# Patient Record
Sex: FEMALE | Employment: OTHER | URBAN - METROPOLITAN AREA
[De-identification: names, ages, dates, MRNs, and addresses within clinical notes are randomized per-mention and may not be internally consistent; named-entity substitution may affect disease eponyms.]

---

## 2017-03-31 ENCOUNTER — OFFICE VISIT (OUTPATIENT)
Dept: CARDIOLOGY CLINIC | Age: 72
End: 2017-03-31

## 2017-03-31 VITALS
HEART RATE: 71 BPM | DIASTOLIC BLOOD PRESSURE: 78 MMHG | RESPIRATION RATE: 16 BRPM | SYSTOLIC BLOOD PRESSURE: 126 MMHG | WEIGHT: 99 LBS | OXYGEN SATURATION: 98 % | HEIGHT: 64 IN | BODY MASS INDEX: 16.9 KG/M2

## 2017-03-31 DIAGNOSIS — M54.9 BACK PAIN, UNSPECIFIED BACK LOCATION, UNSPECIFIED BACK PAIN LATERALITY, UNSPECIFIED CHRONICITY: ICD-10-CM

## 2017-03-31 DIAGNOSIS — I42.9 CARDIOMYOPATHY (HCC): Primary | ICD-10-CM

## 2017-03-31 DIAGNOSIS — I50.22 SYSTOLIC CHF, CHRONIC (HCC): ICD-10-CM

## 2017-03-31 DIAGNOSIS — M33.20 POLYMYOSITIS (HCC): ICD-10-CM

## 2017-03-31 DIAGNOSIS — R53.81 DEBILITY: ICD-10-CM

## 2017-03-31 DIAGNOSIS — G72.41 INCLUSION BODY MYOSITIS (IBM): ICD-10-CM

## 2017-03-31 RX ORDER — FOLIC ACID 1 MG/1
TABLET ORAL DAILY
COMMUNITY

## 2017-03-31 NOTE — MR AVS SNAPSHOT
Visit Information Date & Time Provider Department Dept. Phone Encounter #  
 3/31/2017  3:20 PM Frank Collins MD [facility] [place] [phone] [id number] Follow-up Instructions Return in about 6 months (around 9/30/2017). Your Appointments 10/2/2017  3:00 PM  
ECHO CARDIOGRAMS 2D with ECHO, STFRANCIS  
[facility] (ARI CRISTHIAN) Appt Note: echo at 3pm per Dr Trent Lee 6 month follow up at [facility] [address] [facility] [address]  
[phone]  
  
   
 [address] [facility] [place] [address] [facility] [place]  
  
    
 10/2/2017  4:00 PM  
ESTABLISHED PATIENT with Frank Collins MD  
[facility] (Addis Silverio) Appt Note: echo at 3pm per Dr Trent Lee 6 month follow up at [facility] [address] [facility]  
[address] [facility] [address] [address] [place], [place] [address]t Upcoming Health Maintenance Date Due Hepatitis C Screening 1945 DTaP/Tdap/Td series (1 - Tdap) 12/17/1967 BREAST CANCER SCRN MAMMOGRAM 12/17/1996 FOBT Q 1 YEAR AGE 50-75 12/17/1996 ZOSTER VACCINE AGE 60> 12/17/2006 GLAUCOMA SCREENING Q2Y 12/17/2011 Pneumococcal 65+ High/Highest Risk (1 of 2 - PCV13) 12/17/2011 MEDICARE YEARLY EXAM 12/17/2011 INFLUENZA AGE 9 TO ADULT 8/1/2016 Allergies as of 3/31/2017  Review Complete On: 3/31/2017 By: Renée Chavez NP Severity Noted Reaction Type Reactions Macrobid [Nitrofurantoin Monohyd/m-cryst]  10/31/2011   Side Effect Hives Morphine  05/15/2015   Side Effect Other (comments) Severe headache  
 Sulfa (Sulfonamide Antibiotics)  10/31/2011   Side Effect Hives Current Immunizations  Never Reviewed No immunizations on file. Not reviewed this visit You Were Diagnosed With   
  
 Codes Comments Cardiomyopathy ([facility]    -  Primary ICD-10-CM: I42.9 ICD-9-CM: 425.4 Polymyositis ([facility]     ICD-10-CM: M33.20 ICD-9-CM: 710.4 Inclusion body myositis (IBM)     ICD-10-CM: G72.41 
ICD-9-CM: 359.71 Systolic CHF, chronic (HCC)     ICD-10-CM: I50.22 ICD-9-CM: 428.22, 428.0 Vitals BP Pulse Resp Height(growth percentile) Weight(growth percentile) SpO2  
 126/78 (BP 1 Location: Left arm, BP Patient Position: Sitting) 71 16 5' 3.5\" (1.613 m) 99 lb (44.9 kg) 98% BMI OB Status Smoking Status 17.26 kg/m2 Postmenopausal Never Smoker BMI and BSA Data Body Mass Index Body Surface Area  
 17.26 kg/m 2 1.42 m 2 Preferred Pharmacy Pharmacy Name Phone [place], [address] [facility] Lianna Kam [phone] Your Updated Medication List  
  
   
This list is accurate as of: 3/31/17  4:02 PM.  Always use your most recent med list.  
  
  
  
  
 carvedilol 6.25 mg tablet Commonly known as:  COREG  
take 1 tablet by mouth twice a day with meals COQ10  PO Take  by mouth. folic acid 1 mg tablet Commonly known as:  Google Take  by mouth daily. loperamide 2 mg tablet Commonly known as:  IMMODIUM Take 2 mg by mouth daily as needed for Diarrhea. METHOTREXATE 10 mg by Does Not Apply route every seven (7) days. TYLENOL PM PO Take 2 Tabs by mouth nightly. VITAMIN B-12 1,000 mcg tablet Generic drug:  cyanocobalamin Take 1,000 mcg by mouth daily. VITAMIN C 500 mg tablet Generic drug:  ascorbic acid (vitamin C) Take 500 mg by mouth daily. We Performed the Following AMB POC EKG ROUTINE W/ 12 LEADS, INTER & REP [59509 CPT(R)] Follow-up Instructions Return in about 6 months (around 9/30/2017). To-Do List   
 Around 09/30/2017 ECHO:  2D ECHO COMPLETE ADULT (TTE) W OR WO CONTR Patient Instructions Continue your current medication regimen. Please notify us if you have any change in shortness of breath, fatigue, cough or any other questions or concerns prior to your next office visit. We will repeat your Echo again in 6 months. Introducing [facility] [place] [facility] SERVICES! Dear Stephanie Fair: Thank you for requesting a Widbook account. Our records indicate that you already have an active Widbook account. You can access your account anytime at [email or web address] Did you know that you can access your hospital and ER discharge instructions at any time in Widbook? You can also review all of your test results from your hospital stay or ER visit. Additional Information If you have questions, please visit the Frequently Asked Questions section of the Widbook website at [email or web address] Remember, Widbook is NOT to be used for urgent needs. For medical emergencies, dial 911. Now available from your iPhone and Android! Please provide this summary of care documentation to your next provider. Your primary care clinician is listed as Furman Runner Cho. If you have any questions after today's visit, please call [phone].

## 2017-03-31 NOTE — PATIENT INSTRUCTIONS
Continue your current medication regimen. Please notify us if you have any change in shortness of breath, fatigue, cough or any other questions or concerns prior to your next office visit. We will repeat your Echo again in 6 months.

## 2017-03-31 NOTE — PROGRESS NOTES
[address] [facility], [address] [facility]    Office [phone]  Fax [phone]       Hamilton Barahona is a 80 y.o. female last seen 9 months ago. Assessment  Encounter Diagnoses   Name Primary?  Cardiomyopathy ([facility] Yes    Polymyositis ([facility]     Inclusion body myositis (IBM)     Systolic CHF, chronic (HCC)     Back pain, unspecified back location, unspecified back pain laterality, unspecified chronicity     Debility        Recommendations:  Mrs Hal Segovia has a NICM in the setting of inclusion body myositis, the latter progressive. She has a stable pattern of moderate LV dysfunction over the past 2 years, last assessment with Echo 6/2016. No s/sxs of HF. Cardiac MRI did not demonstrate any obvious myocardial pathology. Continue low dose carvedilol . Reassess LV function in 6 months with echo. Follow-up Disposition:  Return in about 6 months (around 9/30/2017). Subjective:  Ms. Hal Segovia has had a progression in her weakness and is now wheelchair bound. She has lost significant weight; 25 pounds down since her last visit. She is consulting with a Nutritionist with her Neurologist office. Trying to supplement with Boost shakes. She denies any exertional chest pain, dyspnea, palpitations, syncope, orthopnea, edema or paroxysmal nocturnal dyspnea. She denies lightheadedness or dizziness. Cardiac risk factors   HTN no  DM  no  Smoking no  Family hx of CAD yes, mother with CAD    Cardiac testing    Echo 8/14/13 ([facility]) - EF 35-40% global, septal dyskinesis  Lexiscan cardiolite 9/30/13 - normal Lexiscan , EF 47%  Cardiac MRI 6/2014 - normal myocardium, EF 49%    Echo 5/15/2014 - EF 35-40%, inferior AK  Echo 1/15/15 - EF 35-40%, inferior hypokinesis  Echo 6/18/15 - LVEF 35-40%  Echo 6/30/16 - EF 35 % to 40 %. Severe hypokinesis of the basal inferior and basal-mid  inferolateral wall(s). Grade 1 diastolic dysfunction.  No significant change when compared to 18-Jun-2015. 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  folic acid (FOLVITE) 1 mg tablet Take  by mouth daily.  METHOTREXATE 10 mg by Does Not Apply route every seven (7) days.  carvedilol (COREG) 6.25 mg tablet take 1 tablet by mouth twice a day with meals (Patient taking differently: 3.125mg BID) 60 Tab 5    ACETAMINOPHEN/DIPHENHYDRAMINE (TYLENOL PM PO) Take 2 Tabs by mouth nightly.  loperamide (IMMODIUM) 2 mg tablet Take 2 mg by mouth daily as needed for Diarrhea.  ascorbic acid (VITAMIN C) 500 mg tablet Take 500 mg by mouth daily.  cyanocobalamin (VITAMIN B-12) 1,000 mcg tablet Take 1,000 mcg by mouth daily. Allergies   Allergen Reactions    Macrobid [Nitrofurantoin Monohyd/M-Cryst] Hives    Morphine Other (comments)     Severe headache    Sulfa (Sulfonamide Antibiotics) Hives      Review of Systems  Constitutional: Negative for fever, chills, malaise/fatigue and diaphoresis. Respiratory: Negative for cough, hemoptysis, sputum production, shortness of breath and wheezing. Cardiovascular: Negative for chest pain, palpitations, orthopnea, claudication, leg swelling and PND. Gastrointestinal: Negative for heartburn, nausea, vomiting, blood in stool and melena. Genitourinary: Negative for dysuria and flank pain. Musculoskeletal: Negative for joint pain and back pain. Skin: Negative for rash. Neurological: Negative for focal weakness, seizures, loss of consciousness, and headaches. Positive for weakness. Endo/Heme/Allergies: Does not bruise/bleed easily. Psychiatric/Behavioral: Negative for memory loss. The patient does not have insomnia.       Physical Exam  Visit Vitals    /78 (BP 1 Location: Left arm, BP Patient Position: Sitting)    Pulse 71    Resp 16    Ht 5' 3.5\" (1.613 m)    Wt 99 lb (44.9 kg)    SpO2 98%    BMI 17.26 kg/m2     Wt Readings from Last 3 Encounters:   03/31/17 99 lb (44.9 kg)   12/20/16 99 lb (44.9 kg)   06/30/16 125 lb (56.7 kg)      General - well developed, thin, wheelchair bound. Neck - JVP normal, thyroid nl  Cardiac - normal S1,S2, no murmurs, rubs or gallops.  No clicks  Vascular - carotids without bruits, radials, femorals and pedal pulses equal bilateral  Lungs - clear to auscultation bilaterals, no rales ,wheezing or rhonchi  Abd - soft nontender, no HSM, no abd bruits  Extremities - no edema  Skin - no rash  Neuro - nonfocal  Psych - normal mood and affect    Cardiographics  ECG 9/2013 - SR, IVCD, PRWP, NSSTs, no previous tracing  EKG 10/21/14 - sinus rhythm, NSSTT abnormalities   Echo 6/18/15 - LVEF 35-40%  EKG 11/10/15 - SR, non specific ST-T abnormalities  EKG 3/31/17 - SR 71    STELLA Uriarte MD

## 2017-04-02 RX ORDER — CARVEDILOL 3.12 MG/1
TABLET ORAL
Qty: 60 TAB | Refills: 5
Start: 2017-04-02 | End: 2017-12-13 | Stop reason: SDUPTHER

## 2017-10-02 ENCOUNTER — CLINICAL SUPPORT (OUTPATIENT)
Dept: CARDIOLOGY CLINIC | Age: 72
End: 2017-10-02

## 2017-10-02 ENCOUNTER — OFFICE VISIT (OUTPATIENT)
Dept: CARDIOLOGY CLINIC | Age: 72
End: 2017-10-02

## 2017-10-02 VITALS
HEART RATE: 97 BPM | WEIGHT: 106 LBS | SYSTOLIC BLOOD PRESSURE: 110 MMHG | OXYGEN SATURATION: 98 % | RESPIRATION RATE: 16 BRPM | BODY MASS INDEX: 18.48 KG/M2 | DIASTOLIC BLOOD PRESSURE: 70 MMHG

## 2017-10-02 DIAGNOSIS — I42.8 NICM (NONISCHEMIC CARDIOMYOPATHY) (HCC): Primary | ICD-10-CM

## 2017-10-02 DIAGNOSIS — R53.81 DEBILITY: ICD-10-CM

## 2017-10-02 DIAGNOSIS — G72.41 INCLUSION BODY MYOSITIS (IBM): ICD-10-CM

## 2017-10-02 DIAGNOSIS — I42.9 CARDIOMYOPATHY, UNSPECIFIED TYPE (HCC): Primary | ICD-10-CM

## 2017-10-02 DIAGNOSIS — M33.20 POLYMYOSITIS (HCC): ICD-10-CM

## 2017-10-02 NOTE — PATIENT INSTRUCTIONS
We would continue your current dose of Coreg. Monitor for any change in shortness of breath, fatigue or any other concerns.   Call us with any problems prior

## 2017-10-02 NOTE — PROGRESS NOTES
[address] [place], [address] [facility]    Office [phone]  Fax [phone]       Keo Staff is a 80 y.o. female last seen 6 months ago. Assessment  Encounter Diagnoses   Name Primary? 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 She has a stable pattern of moderate LV dysfunction, EF 35-40% by repeat Echo today. She has no s/sxs of HF. Cardiac MRI did not demonstrate any obvious myocardial pathology. Reviewed symptoms that would warrant concern. Continue low dose carvedilol. Plan to repeat Echo again in 1 year. The patient was encouraged to continue current medications and call with any new complaints or concerns. Follow-up Disposition:  Return in about 6 months (around 4/2/2018). Subjective:  Ms. Catherine Johns continues to lead a sedentary lifestyle. She remains wheelchair bound. Her appetite has improved and she has been able to gain ~ 7 pounds over the past 6 months. She continues to supplement with Boost shakes. She denies any exertional chest pain. No dyspnea or cough. No palpitations or tachycardia. No orthopnea, edema or paroxysmal nocturnal dyspnea. -130's per monitoring at other providers office visit. She denies lightheadedness or dizziness. Cardiac risk factors   HTN no  DM  no  Smoking no  Family hx of CAD yes, mother with CAD    Cardiac testing  Echo 8/14/13 ([facility]) - EF 35-40% global, septal dyskinesis  Lexiscan cardiolite 9/30/13 - normal Lexiscan , EF 47%  Cardiac MRI 6/2014 - normal myocardium, EF 49%    Echo 5/15/2014 - EF 35-40%, inferior AK  Echo 1/15/15 - EF 35-40%, inferior hypokinesis  Echo 6/18/15 - LVEF 35-40%  Echo 6/30/16 - EF 35 % to 40 %. Severe hypokinesis of the basal inferior and basal-mid  inferolateral wall(s). Grade 1 diastolic dysfunction.  No significant change when compared to 18-Jun-2015. Echo 10/2/17 - EF 35-40%. G1DD. No change compared to study 6/2016. Past Medical History:   Diagnosis Date    Adult T-cell [facility]/lymphoma ([address] dx 2009    Dr Saba Sutton Cardiomyopathy [facility])     nonischemic    Polymyositis [facility]) dx 1997    Rx mtx/steroids    Vertigo         Current Outpatient Prescriptions   Medication Sig Dispense Refill    carvedilol (COREG) 3.125 mg tablet 3.125mg BID 60 Tab 5    UBIDECARENONE/VITAMIN E MIXED (COQ10  PO) Take  by mouth.  ACETAMINOPHEN/DIPHENHYDRAMINE (TYLENOL PM PO) Take 2 Tabs by mouth nightly.  loperamide (IMMODIUM) 2 mg tablet Take 2 mg by mouth daily as needed for Diarrhea.  ascorbic acid (VITAMIN C) 500 mg tablet Take 500 mg by mouth daily.  folic acid (FOLVITE) 1 mg tablet Take  by mouth daily.  METHOTREXATE 10 mg by Does Not Apply route every seven (7) days.  cyanocobalamin (VITAMIN B-12) 1,000 mcg tablet Take 1,000 mcg by mouth daily. Allergies   Allergen Reactions    Macrobid [Nitrofurantoin Monohyd/M-Cryst] Hives    Morphine Other (comments)     Severe headache    Sulfa (Sulfonamide Antibiotics) Hives      Review of Systems  Constitutional: Negative for fever, chills, malaise/fatigue and diaphoresis. Respiratory: Negative for cough, hemoptysis, sputum production, shortness of breath and wheezing. Cardiovascular: Negative for chest pain, palpitations, orthopnea, claudication, leg swelling and PND. Gastrointestinal: Negative for heartburn, nausea, vomiting, blood in stool and melena. Genitourinary: Negative for dysuria and flank pain. Musculoskeletal: Negative for joint pain and back pain. Skin: Negative for rash. Neurological: Negative for focal weakness, seizures, loss of consciousness, and headaches. Positive for weakness. Endo/Heme/Allergies: Does not bruise/bleed easily. Psychiatric/Behavioral: Negative for memory loss. The patient does not have insomnia. Physical Exam  Visit Vitals    /70    Pulse 97    Resp 16    Wt 106 lb (48.1 kg)    SpO2 98%    BMI 18.48 kg/m2     Wt Readings from Last 3 Encounters:   10/02/17 106 lb (48.1 kg)   03/31/17 99 lb (44.9 kg)   12/20/16 99 lb (44.9 kg)      General - well developed, thin, wheelchair bound. Neck - JVP normal, thyroid nl  Cardiac - normal S1,S2, no murmurs, rubs or gallops.  No clicks  Vascular - carotids without bruits, radials, femorals and pedal pulses equal bilateral  Lungs - clear to auscultation bilaterals, no rales ,wheezing or rhonchi  Abd - soft nontender  Extremities - no edema  Skin - no rash  Neuro - nonfocal  Psych - normal mood and affect    Cardiographics  ECG 9/2013 - SR, IVCD, PRWP, NSSTs, no previous tracing  EKG 10/21/14 - sinus rhythm, NSSTT abnormalities   Echo 6/18/15 - LVEF 35-40%  EKG 11/10/15 - SR, non specific ST-T abnormalities  EKG 3/31/17 - SR [address], NP

## 2017-10-02 NOTE — MR AVS SNAPSHOT
Visit Information Date & Time Provider Department Dept. Phone Encounter #  
 10/2/2017  4:00 PM Agnes Spaulding MD [facility] [place] [phone] [id number] Follow-up Instructions Return in about 6 months (around 4/2/2018). Your Appointments 4/13/2018  1:20 PM  
ESTABLISHED PATIENT with Agnes Spaulding MD  
[facility] (Greg Jorgensen) Appt Note: 6 mo fup  
 [address] [facility] [place] [address] [facility]  
54 [facility] [address] [address] Upcoming Health Maintenance Date Due Hepatitis C Screening 1945 DTaP/Tdap/Td series (1 - Tdap) 12/17/1967 BREAST CANCER SCRN MAMMOGRAM 12/17/1996 FOBT Q 1 YEAR AGE 50-75 12/17/1996 ZOSTER VACCINE AGE 60> 10/17/2006 GLAUCOMA SCREENING Q2Y 12/17/2011 Pneumococcal 65+ High/Highest Risk (1 of 2 - PCV13) 12/17/2011 MEDICARE YEARLY EXAM 12/17/2011 INFLUENZA AGE 9 TO ADULT 8/1/2017 Allergies as of 10/2/2017  Review Complete On: 10/2/2017 By: Jonas Prince NP Severity Noted Reaction Type Reactions Macrobid [Nitrofurantoin Monohyd/m-cryst]  10/31/2011   Side Effect Hives Morphine  05/15/2015   Side Effect Other (comments) Severe headache  
 Sulfa (Sulfonamide Antibiotics)  10/31/2011   Side Effect Hives Current Immunizations  Never Reviewed No immunizations on file. Not reviewed this visit You Were Diagnosed With   
  
 Codes Comments Cardiomyopathy, unspecified type ([facility]    -  Primary ICD-10-CM: I42.9 ICD-9-CM: 425.4 Inclusion body myositis (IBM)     ICD-10-CM: G72.41 
ICD-9-CM: 359.71 Vitals BP Pulse Resp Weight(growth percentile) SpO2 BMI  
 110/70 97 16 106 lb (48.1 kg) 98% 18.48 kg/m2 OB Status Smoking Status Postmenopausal Never Smoker Vitals History BMI and BSA Data Body Mass Index Body Surface Area 18.48 kg/m 2 1.47 m 2 Preferred Pharmacy Pharmacy Name Phone [place], [address] [facility] [phone] Your Updated Medication List  
  
   
This list is accurate as of: 10/2/17  4:32 PM.  Always use your most recent med list.  
  
  
  
  
 carvedilol 3.125 mg tablet Commonly known as:  COREG  
3.125mg BID  
  
 COQ10  PO Take  by mouth. folic acid 1 mg tablet Commonly known as:  Google Take  by mouth daily. loperamide 2 mg tablet Commonly known as:  IMMODIUM Take 2 mg by mouth daily as needed for Diarrhea. METHOTREXATE 10 mg by Does Not Apply route every seven (7) days. TYLENOL PM PO Take 2 Tabs by mouth nightly. VITAMIN B-12 1,000 mcg tablet Generic drug:  cyanocobalamin Take 1,000 mcg by mouth daily. VITAMIN C 500 mg tablet Generic drug:  ascorbic acid (vitamin C) Take 500 mg by mouth daily. Follow-up Instructions Return in about 6 months (around 4/2/2018). Introducing [facility] & HEALTH SERVICES! Dear Kyleigh Atnoine: Thank you for requesting a TheCommentor account. Our records indicate that you already have an active TheCommentor account. You can access your account anytime at [email or web address] Did you know that you can access your hospital and ER discharge instructions at any time in TheCommentor? You can also review all of your test results from your hospital stay or ER visit. Additional Information If you have questions, please visit the Frequently Asked Questions section of the TheCommentor website at [email or web address] Remember, TheCommentor is NOT to be used for urgent needs. For medical emergencies, dial 911. Now available from your iPhone and Android! Please provide this summary of care documentation to your next provider. Your primary care clinician is listed as Channing Fischer.  If you have any questions after today's visit, please call [phone].

## 2017-10-20 ENCOUNTER — HOSPITAL ENCOUNTER (EMERGENCY)
Age: 72
Discharge: HOME | End: 2017-10-20
Attending: STUDENT IN AN ORGANIZED HEALTH CARE EDUCATION/TRAINING PROGRAM | Admitting: STUDENT IN AN ORGANIZED HEALTH CARE EDUCATION/TRAINING PROGRAM
Payer: MEDICARE

## 2017-10-20 VITALS
RESPIRATION RATE: 18 BRPM | BODY MASS INDEX: 18.1 KG/M2 | DIASTOLIC BLOOD PRESSURE: 74 MMHG | WEIGHT: 106 LBS | OXYGEN SATURATION: 96 % | HEIGHT: 64 IN | SYSTOLIC BLOOD PRESSURE: 122 MMHG | HEART RATE: 73 BPM | TEMPERATURE: 97.8 F

## 2017-10-20 DIAGNOSIS — N39.0 URINARY TRACT INFECTION WITHOUT HEMATURIA, SITE UNSPECIFIED: Primary | ICD-10-CM

## 2017-10-20 LAB
APPEARANCE UR: ABNORMAL
BACTERIA URNS QL MICRO: ABNORMAL /HPF
BILIRUB UR QL: NEGATIVE
COLOR UR: ABNORMAL
EPITH CASTS URNS QL MICRO: ABNORMAL /LPF
GLUCOSE UR STRIP.AUTO-MCNC: NEGATIVE MG/DL
HGB UR QL STRIP: ABNORMAL
KETONES UR QL STRIP.AUTO: NEGATIVE MG/DL
LEUKOCYTE ESTERASE UR QL STRIP.AUTO: ABNORMAL
NITRITE UR QL STRIP.AUTO: POSITIVE
PH UR STRIP: 6.5 [PH] (ref 5–8)
PROT UR STRIP-MCNC: NEGATIVE MG/DL
RBC #/AREA URNS HPF: ABNORMAL /HPF (ref 0–5)
SP GR UR REFRACTOMETRY: 1.02 (ref 1–1.03)
UA: UC IF INDICATED,UAUC: ABNORMAL
UROBILINOGEN UR QL STRIP.AUTO: 0.2 EU/DL (ref 0.2–1)
WBC URNS QL MICRO: ABNORMAL /HPF (ref 0–4)

## 2017-10-20 PROCEDURE — 87086 URINE CULTURE/COLONY COUNT: CPT | Performed by: STUDENT IN AN ORGANIZED HEALTH CARE EDUCATION/TRAINING PROGRAM

## 2017-10-20 PROCEDURE — 87186 SC STD MICRODIL/AGAR DIL: CPT | Performed by: STUDENT IN AN ORGANIZED HEALTH CARE EDUCATION/TRAINING PROGRAM

## 2017-10-20 PROCEDURE — 99283 EMERGENCY DEPT VISIT LOW MDM: CPT

## 2017-10-20 PROCEDURE — 87077 CULTURE AEROBIC IDENTIFY: CPT | Performed by: STUDENT IN AN ORGANIZED HEALTH CARE EDUCATION/TRAINING PROGRAM

## 2017-10-20 PROCEDURE — 81001 URINALYSIS AUTO W/SCOPE: CPT | Performed by: STUDENT IN AN ORGANIZED HEALTH CARE EDUCATION/TRAINING PROGRAM

## 2017-10-20 RX ORDER — PHENAZOPYRIDINE HYDROCHLORIDE 200 MG/1
200 TABLET, FILM COATED ORAL 3 TIMES DAILY
Qty: 6 TAB | Refills: 0 | Status: SHIPPED | OUTPATIENT
Start: 2017-10-20 | End: 2017-10-22

## 2017-10-20 RX ORDER — CEPHALEXIN 500 MG/1
500 CAPSULE ORAL 4 TIMES DAILY
Qty: 28 CAP | Refills: 0 | Status: SHIPPED | OUTPATIENT
Start: 2017-10-20 | End: 2017-10-27

## 2017-10-20 NOTE — ED TRIAGE NOTES
Burning with urination started yesterday. Started cranberry juice. No other symptoms, no fever.  Attempted to see PCP, unsuccessful

## 2017-10-20 NOTE — DISCHARGE INSTRUCTIONS
Urinary Tract Infection in Women: Care Instructions  Your Care Instructions    A urinary tract infection, or UTI, is a general term for an infection anywhere between the kidneys and the urethra (where urine comes out). Most UTIs are bladder infections. They often cause pain or burning when you urinate. UTIs are caused by bacteria and can be cured with antibiotics. Be sure to complete your treatment so that the infection goes away. Follow-up care is a key part of your treatment and safety. Be sure to make and go to all appointments, and call your doctor if you are having problems. It's also a good idea to know your test results and keep a list of the medicines you take. How can you care for yourself at home? · Take your antibiotics as directed. Do not stop taking them just because you feel better. You need to take the full course of antibiotics. · Drink extra water and other fluids for the next day or two. This may help wash out the bacteria that are causing the infection. (If you have kidney, heart, or liver disease and have to limit fluids, talk with your doctor before you increase your fluid intake.)  · Avoid drinks that are carbonated or have caffeine. They can irritate the bladder. · Urinate often. Try to empty your bladder each time. · To relieve pain, take a hot bath or lay a heating pad set on low over your lower belly or genital area. Never go to sleep with a heating pad in place. To prevent UTIs  · Drink plenty of water each day. This helps you urinate often, which clears bacteria from your system. (If you have kidney, heart, or liver disease and have to limit fluids, talk with your doctor before you increase your fluid intake.)  · Urinate when you need to. · Urinate right after you have sex. · Change sanitary pads often. · Avoid douches, bubble baths, feminine hygiene sprays, and other feminine hygiene products that have deodorants.   · After going to the bathroom, wipe from front to back.  When should you call for help? Call your doctor now or seek immediate medical care if:  · Symptoms such as fever, chills, nausea, or vomiting get worse or appear for the first time. · You have new pain in your back just below your rib cage. This is called flank pain. · There is new blood or pus in your urine. · You have any problems with your antibiotic medicine. Watch closely for changes in your health, and be sure to contact your doctor if:  · You are not getting better after taking an antibiotic for 2 days. · Your symptoms go away but then come back. Where can you learn more? Go to http://suzie-amilcar.info/. Enter W528 in the search box to learn more about \"Urinary Tract Infection in Women: Care Instructions. \"  Current as of: November 28, 2016  Content Version: 11.3  © 2128-1511 [employer], [employer]. Care instructions adapted under license by [employer] (which disclaims liability or warranty for this information). If you have questions about a medical condition or this instruction, always ask your healthcare professional. [address] any warranty or liability for your use of this information.

## 2017-10-20 NOTE — ED NOTES
Patient discharged home after receiving discharge instructions from MD/PA. Patient voiced understanding and doesn't have any questions at this time. Patient in no distress at this time.  Pt in electric w/c and wheeled herself out of the ER

## 2017-10-20 NOTE — ED PROVIDER NOTES
Patient is a 80 y.o. female presenting with urinary pain. The history is provided by the patient. No  was used. Urinary Pain    This is a new problem. The current episode started yesterday. The problem occurs every urination. The problem has not changed since onset. The quality of the pain is described as burning and stabbing. The pain is at a severity of 3/10. The pain is mild. There has been no fever. There is no history of pyelonephritis. Associated symptoms include frequency, hesitancy, urgency and flank pain. Pertinent negatives include no chills, no nausea, no vomiting, no hematuria, no abdominal pain and no back pain. She has tried increased fluids for the symptoms. The treatment provided no relief. Past medical history comments: inclusion body myositis. 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 Social History Main Topics    Smoking status: Never Smoker    Smokeless tobacco: Never Used    Alcohol use No    Drug use: No    Sexual activity: Not on file     Other Topics Concern    Not on file     Social History Narrative         ALLERGIES: Macrobid [nitrofurantoin monohyd/m-cryst]; Morphine; and Sulfa (sulfonamide antibiotics)    Review of Systems   Constitutional: Negative for chills and fever. HENT: Negative for sore throat. Respiratory: Negative for cough and shortness of breath. Cardiovascular: Negative for chest pain. Gastrointestinal: Negative for abdominal pain, nausea and vomiting. Genitourinary: Positive for flank pain, frequency, hesitancy and urgency. Negative for dysuria and hematuria. Musculoskeletal: Negative for back pain. Skin: Negative for rash. Neurological: Negative for syncope and headaches. Psychiatric/Behavioral: Negative for confusion. All other systems reviewed and are negative. There were no vitals filed for this visit. Physical Exam   Constitutional: She is oriented to person, place, and time. She appears well-developed. No distress. HENT:   Head: Normocephalic and atraumatic. Eyes: Conjunctivae and EOM are normal. Pupils are equal, round, and reactive to light. Neck: Normal range of motion. Neck supple. Cardiovascular: Normal rate, regular rhythm and normal heart sounds. No murmur heard. Pulmonary/Chest: Effort normal and breath sounds normal. No respiratory distress. Abdominal: Soft. Bowel sounds are normal. She exhibits no distension. There is no tenderness. There is no rebound. Musculoskeletal: Normal range of motion. She exhibits no edema. Neurological: She is alert and oriented to person, place, and time. No cranial nerve deficit. Coordination normal.   Skin: Skin is warm and dry. No rash noted. Psychiatric: She has a normal mood and affect. Her behavior is normal.   Nursing note and vitals reviewed.        MDM  ED Course       Procedures

## 2017-10-22 LAB
BACTERIA SPEC CULT: ABNORMAL
BACTERIA SPEC CULT: ABNORMAL
CC UR VC: ABNORMAL
SERVICE CMNT-IMP: ABNORMAL

## 2017-12-13 RX ORDER — CARVEDILOL 3.12 MG/1
3.12 TABLET ORAL 2 TIMES DAILY WITH MEALS
Qty: 180 TAB | Refills: 3 | Status: SHIPPED | OUTPATIENT
Start: 2017-12-13 | End: 2018-07-28 | Stop reason: ALTCHOICE

## 2018-01-25 ENCOUNTER — HOSPITAL ENCOUNTER (EMERGENCY)
Age: 73
Discharge: HOME | End: 2018-01-25
Attending: EMERGENCY MEDICINE
Payer: MEDICARE

## 2018-01-25 VITALS
HEIGHT: 63 IN | WEIGHT: 113 LBS | BODY MASS INDEX: 20.02 KG/M2 | HEART RATE: 77 BPM | RESPIRATION RATE: 18 BRPM | TEMPERATURE: 98.1 F | DIASTOLIC BLOOD PRESSURE: 76 MMHG | SYSTOLIC BLOOD PRESSURE: 149 MMHG | OXYGEN SATURATION: 96 %

## 2018-01-25 DIAGNOSIS — N30.01 ACUTE CYSTITIS WITH HEMATURIA: Primary | ICD-10-CM

## 2018-01-25 LAB
APPEARANCE UR: ABNORMAL
BACTERIA URNS QL MICRO: ABNORMAL /HPF
BILIRUB UR QL: NEGATIVE
COLOR UR: ABNORMAL
EPITH CASTS URNS QL MICRO: ABNORMAL /LPF
GLUCOSE UR STRIP.AUTO-MCNC: NEGATIVE MG/DL
HGB UR QL STRIP: ABNORMAL
KETONES UR QL STRIP.AUTO: NEGATIVE MG/DL
LEUKOCYTE ESTERASE UR QL STRIP.AUTO: ABNORMAL
NITRITE UR QL STRIP.AUTO: POSITIVE
PH UR STRIP: 6 [PH] (ref 5–8)
PROT UR STRIP-MCNC: 30 MG/DL
RBC #/AREA URNS HPF: ABNORMAL /HPF (ref 0–5)
SP GR UR REFRACTOMETRY: 1.02 (ref 1–1.03)
UR CULT HOLD, URHOLD: NORMAL
UROBILINOGEN UR QL STRIP.AUTO: 0.2 EU/DL (ref 0.2–1)
WBC URNS QL MICRO: ABNORMAL /HPF (ref 0–4)

## 2018-01-25 PROCEDURE — 99283 EMERGENCY DEPT VISIT LOW MDM: CPT

## 2018-01-25 PROCEDURE — 87077 CULTURE AEROBIC IDENTIFY: CPT | Performed by: PHYSICIAN ASSISTANT

## 2018-01-25 PROCEDURE — 87186 SC STD MICRODIL/AGAR DIL: CPT | Performed by: PHYSICIAN ASSISTANT

## 2018-01-25 PROCEDURE — 87086 URINE CULTURE/COLONY COUNT: CPT | Performed by: PHYSICIAN ASSISTANT

## 2018-01-25 PROCEDURE — 81001 URINALYSIS AUTO W/SCOPE: CPT | Performed by: PHYSICIAN ASSISTANT

## 2018-01-25 RX ORDER — CEPHALEXIN 500 MG/1
500 CAPSULE ORAL 2 TIMES DAILY
Qty: 14 CAP | Refills: 0 | Status: SHIPPED | OUTPATIENT
Start: 2018-01-25 | End: 2018-02-01

## 2018-01-25 RX ORDER — PHENAZOPYRIDINE HYDROCHLORIDE 200 MG/1
200 TABLET, FILM COATED ORAL
Qty: 6 TAB | Refills: 0 | Status: SHIPPED | OUTPATIENT
Start: 2018-01-25 | End: 2018-01-27

## 2018-01-25 NOTE — ED TRIAGE NOTES
\"Last night it began to feel not good when I urinated. I know that feeling and it is when I have a UTI. \" Also, c/o frequency. \"I don't usually have to be catheterized, I can pee into a cup. \" Patient arrived in a motorized wheelchair.

## 2018-01-25 NOTE — ED NOTES
The patient was discharged home by Janes Jan, [address] in stable condition. The patient is alert and oriented, in no respiratory distress. The patient's diagnosis, condition and treatment were explained. The patient expressed understanding. A discharge plan has been developed. A  was not involved in the process. Aftercare instructions were given. Pt discharged from the ED via her own motorized w/c to her [facility] and the apothecary.

## 2018-01-25 NOTE — ED PROVIDER NOTES
HPI Comments: 72 y/o female with PMHx of polymyositis, T-cell lymphoma and non-ischemic cardiomyopathy, presenting with complaint of dysuria. She states she noticed an onset of discomfort with urination last night, with associated frequency and urgency throughout the night last night. Her symptoms feel similar to a recent UTI a few months ago. She has no pain. She denies fevers, chills, hematuria, back/flank pain, abd pain, nausea or vomiting. The history is provided by the patient. 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 Social History Main Topics    Smoking status: Never Smoker    Smokeless tobacco: Never Used    Alcohol use No    Drug use: No    Sexual activity: Not on file     Other Topics Concern    Not on file     Social History Narrative         ALLERGIES: Macrobid [nitrofurantoin monohyd/m-cryst]; Morphine; and Sulfa (sulfonamide antibiotics)    Review of Systems   Constitutional: Negative for chills and fever. Respiratory: Negative for shortness of breath. Cardiovascular: Negative for chest pain. Gastrointestinal: Negative for abdominal pain, nausea and vomiting. Genitourinary: Positive for dysuria, frequency and urgency. Negative for hematuria. Musculoskeletal: Negative for arthralgias and myalgias. Skin: Negative for wound. Neurological: Negative for syncope and light-headedness.    All other systems reviewed and are negative. Vitals:    01/25/18 1340   BP: 149/76   Pulse: 77   Resp: 18   Temp: 98.1 °F (36.7 °C)   SpO2: 96%   Weight: 51.3 kg (113 lb)   Height: 5' 3\" (1.6 m)            Physical Exam   Constitutional: She is oriented to person, place, and time. She appears well-developed and well-nourished. No distress. HENT:   Head: Normocephalic and atraumatic. Eyes: Conjunctivae and EOM are normal.   Neck: Normal range of motion. Neck supple. Cardiovascular: Normal rate, regular rhythm and normal heart sounds. Pulmonary/Chest: Effort normal and breath sounds normal.   Abdominal: Soft. She exhibits no distension. There is no tenderness. No flank pain or CVA tenderness. Musculoskeletal: She exhibits no edema or tenderness. Neurological: She is alert and oriented to person, place, and time. Skin: Skin is warm and dry. She is not diaphoretic. Nursing note and vitals reviewed. MDM  Number of Diagnoses or Management Options  Acute cystitis with hematuria:      Amount and/or Complexity of Data Reviewed  Clinical lab tests: ordered and reviewed  Discuss the patient with other providers: yes (Dr. Bullard Kidney, ED attending)    Patient Progress  Patient progress: stable    ED Course       Procedures      70 y/o female with PMHx of polymyositis, T-cell lymphoma and non-ischemic cardiomyopathy, presenting with complaint of dysuria. History and exam suggestive of uncomplicated UTI. UA obtained and is consistent with UTI. Vitals within normal limits, no flank/back pain or systemic symptoms to suggest pyelonephritis or sepsis. Safe for outpatient treatment with Keflex 500mg BID x7 days. Urine culture sent. Recommended PCP follow up as needed. Strict ED return precautions discussed and provided in writing at time of discharge. The patient verbalized understanding and agreement with this plan.

## 2018-01-25 NOTE — DISCHARGE INSTRUCTIONS

## 2018-01-27 LAB
BACTERIA SPEC CULT: ABNORMAL
CC UR VC: ABNORMAL
SERVICE CMNT-IMP: ABNORMAL

## 2018-05-07 ENCOUNTER — TELEPHONE (OUTPATIENT)
Dept: CARDIOLOGY CLINIC | Age: 73
End: 2018-05-07

## 2018-05-07 NOTE — TELEPHONE ENCOUNTER
Patient is calling to try an schedule an appointment with bishop ASAP she says we canceled on her last time   Phone: [phone]

## 2018-07-27 ENCOUNTER — OFFICE VISIT (OUTPATIENT)
Dept: CARDIOLOGY CLINIC | Age: 73
End: 2018-07-27

## 2018-07-27 VITALS
OXYGEN SATURATION: 97 % | SYSTOLIC BLOOD PRESSURE: 100 MMHG | BODY MASS INDEX: 19.14 KG/M2 | WEIGHT: 108 LBS | HEART RATE: 75 BPM | DIASTOLIC BLOOD PRESSURE: 80 MMHG | HEIGHT: 63 IN

## 2018-07-27 DIAGNOSIS — G72.41 INCLUSION BODY MYOSITIS (IBM): ICD-10-CM

## 2018-07-27 DIAGNOSIS — I42.8 NICM (NONISCHEMIC CARDIOMYOPATHY) (HCC): ICD-10-CM

## 2018-07-27 DIAGNOSIS — C91.51 ADULT T-CELL LEUKEMIA/LYMPHOMA IN REMISSION (HCC): ICD-10-CM

## 2018-07-27 DIAGNOSIS — M33.20 POLYMYOSITIS (HCC): ICD-10-CM

## 2018-07-27 DIAGNOSIS — I50.20 ACC/AHA STAGE B SYSTOLIC HEART FAILURE (HCC): Primary | ICD-10-CM

## 2018-07-27 NOTE — PROGRESS NOTES
[id number] Deniz Perrin Jr [facility], [address] [facility]    Office [phone]  Fax [phone]       Rolando Laguna is a 71 y.o. female last seen 9 months ago. Assessment  Encounter Diagnoses   Name Primary?  NICM (nonischemic cardiomyopathy) ([facility] [address]     Inclusion body myositis ([facility])     Polymyositis ([facility] [address]     Adult T-cell [facility]/lymphoma in remission ([facility] [address]     ACC/AHA stage B systolic heart failure Yes     Recommendations:  Mrs Edmond Hernandez has a NICM in the setting of inclusion body myositis, stage B HF. LVEF has been in the 35-40% range, most recently by echo 10/2017. Cardiac MRI did not demonstrate any obvious myocardial pathology. Reviewed symptoms that would warrant concern. Continue low dose carvedilol, but switch to extended release preparation for ease of use. Repeat echo in 6 months. Her mental outlook has been quite positive despite her disability from her myopathy. Follow-up Disposition:  Return in about 6 months (around 1/27/2019). Subjective:  Ms. Edmond Hernandez she reports weakness. However, she notes her back pain has significantly improved. She uses an electronic wheelchair, which is very helpful to her. She has a caretaker coming to her house regularly. She reports some difficulty swallowing, but notes she takes small bites and chews carefully. Patient reports some difficulty remembering to take her Coreg as prescribed. She is not currently on chemotherapy for her [facility]     She denies any exertional chest pain, dyspnea, palpitations, syncope, orthopnea, edema or paroxysmal nocturnal dyspnea.     Cardiac risk factors   HTN no  DM  no  Smoking no  Family hx of CAD yes, mother with CAD    Cardiac testing  Echo 8/14/13 ([facility]) - EF 35-40% global, septal dyskinesis  Lexiscan cardiolite 9/30/13 - normal Lexiscan , EF 47%  Cardiac MRI 6/2014 - normal myocardium, EF 49%    Echo 5/15/2014 - EF 35-40%, inferior AK  Echo 1/15/15 - EF 35-40%, inferior hypokinesis  Echo 6/18/15 - LVEF 35-40%  Echo 6/30/16 - EF 35 % to 40 %. Severe hypokinesis of the basal inferior and basal-mid  inferolateral wall(s). Grade 1 diastolic dysfunction. No significant change when compared to 18-Jun-2015. Echo 10/2/17 - EF 35-40%. G1DD. No change compared to study 6/2016. Past Medical History:   Diagnosis Date    Adult T-cell [facility]/lymphoma ([address] dx 2009    Dr Jovanni Enrique Cardiomyopathy [facility])     nonischemic    Polymyositis [facility]) dx 1997    Rx mtx/steroids    Vertigo         Current Outpatient Prescriptions   Medication Sig Dispense Refill    carvedilol (COREG) 3.125 mg tablet Take 1 Tab by mouth two (2) times daily (with meals). (Patient taking differently: Take 3.125 mg by mouth daily. ) 180 Tab 3    UBIDECARENONE/VITAMIN E MIXED (COQ10  PO) Take  by mouth.  ACETAMINOPHEN/DIPHENHYDRAMINE (TYLENOL PM PO) Take 2 Tabs by mouth nightly.  loperamide (IMMODIUM) 2 mg tablet Take 2 mg by mouth daily as needed for Diarrhea.  ascorbic acid (VITAMIN C) 500 mg tablet Take 500 mg by mouth daily.  folic acid (FOLVITE) 1 mg tablet Take  by mouth daily.  cyanocobalamin (VITAMIN B-12) 1,000 mcg tablet Take 1,000 mcg by mouth daily. Allergies   Allergen Reactions    Macrobid [Nitrofurantoin Monohyd/M-Cryst] Hives    Morphine Other (comments)     Severe headache    Sulfa (Sulfonamide Antibiotics) Hives      Review of Systems  Constitutional: Negative for fever, chills, malaise/fatigue and diaphoresis. Respiratory: Negative for cough, hemoptysis, sputum production, shortness of breath and wheezing. Cardiovascular: Negative for chest pain, palpitations, orthopnea, claudication, leg swelling and PND. Gastrointestinal: Negative for heartburn, nausea, vomiting, blood in stool and melena. Genitourinary: Negative for dysuria and flank pain. Musculoskeletal: Negative for joint pain and back pain. Skin: Negative for rash.    Neurological: Negative for focal weakness, seizures, loss of consciousness, and headaches. Positive for weakness. Endo/Heme/Allergies: Does not bruise/bleed easily. Psychiatric/Behavioral: Negative for memory loss. The patient does not have insomnia. Physical Exam  Visit Vitals    /80 (BP 1 Location: Right arm, BP Patient Position: Sitting)    Pulse 75    Ht 5' 3\" (1.6 m)    Wt 108 lb (49 kg)    SpO2 97%    BMI 19.13 kg/m2     Wt Readings from Last 3 Encounters:   07/27/18 108 lb (49 kg)   01/25/18 113 lb (51.3 kg)   10/20/17 106 lb (48.1 kg)      General - well developed, thin, wheelchair bound. Neck - JVP normal, thyroid nl  Cardiac - normal S1,S2, no murmurs, rubs or gallops. No clicks  Vascular - carotids without bruits, radials, femorals and pedal pulses equal bilateral  Lungs - clear to auscultation bilaterals, no rales ,wheezing or rhonchi  Abd - soft nontender  Extremities - no edema  Skin - no rash  Neuro - nonfocal  Psych - normal mood and affect    Cardiographics  ECG 9/2013 - SR, IVCD, PRWP, NSSTs, no previous tracing  EKG 10/21/14 - sinus rhythm, NSSTT abnormalities   Echo 6/18/15 - LVEF 35-40%  EKG 11/10/15 - SR, non specific ST-T abnormalities  EKG 3/31/17 - SR 71  EKG 7/17/18 - SR. Normal EKG. Written by Gardenia Cano, as dictated by Dr. Laurita Conner.        Alka Amos

## 2018-07-27 NOTE — MR AVS SNAPSHOT
[address] [address] [facility] 
[phone] Patient: Elsi Calloway MRN: [id number] :1945 Visit Information Date & Time Provider Department Dept. Phone Encounter #  
 2018  2:40 PM Derrick Alfaro MD [facility] [phone] [id number] Follow-up Instructions Return in about 6 months (around 2019). Upcoming Health Maintenance Date Due Hepatitis C Screening 1945 DTaP/Tdap/Td series (1 - Tdap) 1967 BREAST CANCER SCRN MAMMOGRAM 1996 FOBT Q 1 YEAR AGE 50-75 1996 ZOSTER VACCINE AGE 60> 10/17/2006 GLAUCOMA SCREENING Q2Y 2011 Bone Densitometry (Dexa) Screening 2011 Pneumococcal 65+ High/Highest Risk (1 of 2 - PCV13) 2011 MEDICARE YEARLY EXAM 3/14/2018 Influenza Age 5 to Adult 2018 Allergies as of 2018  Review Complete On: 2018 By: Ciro Yates  
  
 Severity Noted Reaction Type Reactions Macrobid [Nitrofurantoin Monohyd/m-cryst]  10/31/2011   Side Effect Hives Morphine  05/15/2015   Side Effect Other (comments) Severe headache  
 Sulfa (Sulfonamide Antibiotics)  10/31/2011   Side Effect Hives Current Immunizations  Never Reviewed No immunizations on file. Not reviewed this visit You Were Diagnosed With   
  
 Codes Comments [facility]    -  Primary ICD-10-CM: I42.9 ICD-9-CM: 425.4 NICM (nonischemic cardiomyopathy) ([facility]     ICD-10-CM: I42.8 ICD-9-CM: 425.4 Systolic CHF, chronic (HCC)     ICD-10-CM: I50.22 ICD-9-CM: 428.22, 428.0 Vitals BP Pulse Height(growth percentile) Weight(growth percentile) SpO2 BMI  
 100/80 (BP 1 Location: Right arm, BP Patient Position: Sitting) 75 5' 3\" (1.6 m) 108 lb (49 kg) 97% 19.13 kg/m2 OB Status Smoking Status Postmenopausal Never Smoker Vitals History BMI and BSA Data Body Mass Index Body Surface Area  
 19.13 kg/m 2 1.48 m 2 Preferred Pharmacy Pharmacy Name Phone [place], [address] [phone] Your Updated Medication List  
  
   
This list is accurate as of 7/27/18  3:22 PM.  Always use your most recent med list.  
  
  
  
  
 carvedilol 3.125 mg tablet Commonly known as:  Sharolyn Quinonez Take 1 Tab by mouth two (2) times daily (with meals). COQ10  PO Take  by mouth. folic acid 1 mg tablet Commonly known as:  [employer] Take  by mouth daily. loperamide 2 mg tablet Commonly known as:  IMMODIUM Take 2 mg by mouth daily as needed for Diarrhea. TYLENOL PM PO Take 2 Tabs by mouth nightly. VITAMIN B-12 1,000 mcg tablet Generic drug:  cyanocobalamin Take 1,000 mcg by mouth daily. VITAMIN C 500 mg tablet Generic drug:  ascorbic acid (vitamin C) Take 500 mg by mouth daily. We Performed the Following AMB POC EKG ROUTINE W/ 12 LEADS, INTER & REP [18478 CPT(R)] Follow-up Instructions Return in about 6 months (around 1/27/2019). Patient Instructions Start Coreg CR 10 mg once daily Introducing [facility]! Dear Rio Thompson: Thank you for requesting a [employer] account. Our records indicate that you already have an active [employer] account. You can access your account anytime at [email or web address]. [facility]/[employer] Did you know that you can access your hospital and ER discharge instructions at any time in [employer]? You can also review all of your test results from your hospital stay or ER visit. Additional Information If you have questions, please visit the Frequently Asked Questions section of the [employer] website at [email or web address]. [facility]/[employer]/. Remember, [employer] is NOT to be used for urgent needs. For medical emergencies, dial 911. Now available from your iPhone and Android! Please provide this summary of care documentation to your next provider. Your primary care clinician is listed as Jetty Buttery Cho. If you have any questions after today's visit, please call [phone].

## 2018-07-27 NOTE — PROGRESS NOTES
Visit Vitals    /80 (BP 1 Location: Right arm, BP Patient Position: Sitting)    Pulse 75    Ht 5' 3\" (1.6 m)    Wt 108 lb (49 kg)    SpO2 97%    BMI 19.13 kg/m2

## 2018-07-28 PROBLEM — I50.20 ACC/AHA STAGE B SYSTOLIC HEART FAILURE (HCC): Status: ACTIVE | Noted: 2018-07-28

## 2018-07-28 RX ORDER — CARVEDILOL PHOSPHATE 10 MG/1
10 CAPSULE, EXTENDED RELEASE ORAL
Qty: 30 CAP | Refills: 5 | Status: SHIPPED | OUTPATIENT
Start: 2018-07-28 | End: 2018-08-01 | Stop reason: ALTCHOICE

## 2018-07-31 ENCOUNTER — TELEPHONE (OUTPATIENT)
Dept: CARDIOLOGY CLINIC | Age: 73
End: 2018-07-31

## 2018-07-31 NOTE — TELEPHONE ENCOUNTER
PA initiated through Cover my meds key # [facility] PA Case ID: [id number]    Awaiting response:  [employer] is reviewing your PA request. Typically an electronic response will be received within 72 hours. To check for an update later, open this request from your dashboard.

## 2018-08-01 RX ORDER — CARVEDILOL 3.12 MG/1
3.12 TABLET ORAL 2 TIMES DAILY WITH MEALS
Qty: 180 TAB | Refills: 1 | Status: SHIPPED | OUTPATIENT
Start: 2018-08-01 | End: 2019-02-07 | Stop reason: SDUPTHER

## 2018-08-01 NOTE — TELEPHONE ENCOUNTER
Prior auth DENIED for carvedilol ER    Request Reference Number: [id number]. CARVEDILOL CAP 10MG ER is denied for not meeting the prior authorization requirement(s). For further questions, call [phone]. Please submit a new alterative. They will cover regular release carvedilol to take BID. Dr. Celeste Gilliam was trying to give something that was easier to remember.

## 2019-01-30 NOTE — PROGRESS NOTES
[id number]a Navarrete  [place], [address] [facility]    Office [phone]  Fax [phone]       Tennis Nyhan is a 68 y.o. female last seen 6 months ago. Assessment  Encounter Diagnoses   Name Primary?  NICM (nonischemic cardiomyopathy) ([facility] [address] Yes    Polymyositis ([facility] [address]     Inclusion body myositis ([facility])      Recommendations:  Mrs Khadijah Guy has a NICM in the setting of inclusion body myositis, stage B HF. LVEF has been in the 35-40% range, but today her EF by echo is 40-45%. Cardiac MRI 2014 did not demonstrate any obvious myocardial pathology. Her functional capacity is markedly limited due to her myositis. She is wheelchair bound. Continue low dose carvedilol. Repeat echo in 6 months. Her mental outlook remains very quite positive despite her disability from her myopathy. Follow-up Disposition:  Return in about 6 months (around 8/1/2019). Subjective:  Ms. Khadijah Guy states she is doing well. She has no shortness of breath with routine activities. She does have post-nasal drip/clear rhinorrhea when laying down. She has had no sinus sxs. She is adherent with Zyrtec and Mucinex, with improvement. Patient denies any exertional chest pain, dyspnea, palpitations, syncope, orthopnea, edema or paroxysmal nocturnal dyspnea. She has an aide who helps her around the house. She is wheelchair bound. Her  has chronic knee and spinal pain. Cardiac risk factors   HTN no  DM  no  Smoking no  Family hx of CAD yes, mother with CAD    Cardiac testing  Echo 8/14/13 ([facility]) - EF 35-40% global, septal dyskinesis  Lexiscan cardiolite 9/30/13 - normal Lexiscan , EF 47%  Cardiac MRI 6/2014 - normal myocardium, EF 49%    Echo 5/15/2014 - EF 35-40%, inferior AK  Echo 1/15/15 - EF 35-40%, inferior hypokinesis  Echo 6/18/15 - LVEF 35-40%  Echo 6/30/16 - EF 35 % to 40 %. Severe hypokinesis of the basal inferior and basal-mid  inferolateral wall(s). Grade 1 diastolic dysfunction.  No significant change when compared to 18-Jun-2015. Echo 10/2/17 - EF 35-40%. G1DD. No change compared to study 6/2016. 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)     nonischemic    Polymyositis [facility]) dx 1997    Rx mtx/steroids    Vertigo         Current Outpatient Medications   Medication Sig Dispense Refill    ergocalciferol (VITAMIN D2) 50,000 unit capsule Take 50,000 Units by mouth every seven (7) days.  carvedilol (COREG) 3.125 mg tablet Take 1 Tab by mouth two (2) times daily (with meals). 180 Tab 1    UBIDECARENONE/VITAMIN E MIXED (COQ10  PO) Take  by mouth.  loperamide (IMMODIUM) 2 mg tablet Take 2 mg by mouth daily as needed for Diarrhea.  ascorbic acid (VITAMIN C) 500 mg tablet Take 500 mg by mouth daily.  folic acid (FOLVITE) 1 mg tablet Take  by mouth daily.  ACETAMINOPHEN/DIPHENHYDRAMINE (TYLENOL PM PO) Take 2 Tabs by mouth nightly.  cyanocobalamin (VITAMIN B-12) 1,000 mcg tablet Take 1,000 mcg by mouth daily. Allergies   Allergen Reactions    Macrobid [Nitrofurantoin Monohyd/M-Cryst] Hives    Morphine Other (comments)     Severe headache    Sulfa (Sulfonamide Antibiotics) Hives      Review of Systems  Constitutional: Negative for fever, chills, malaise/fatigue and diaphoresis. Respiratory: Negative for cough, hemoptysis, sputum production, shortness of breath and wheezing. Cardiovascular: Negative for chest pain, palpitations, orthopnea, claudication, leg swelling and PND. Gastrointestinal: Negative for heartburn, nausea, vomiting, blood in stool and melena. Genitourinary: Negative for dysuria and flank pain. Musculoskeletal: Negative for joint pain and back pain. Skin: Negative for rash. Neurological: Negative for focal weakness, seizures, loss of consciousness, and headaches. Positive for weakness.   Endo/Heme/Allergies: Does not bruise/bleed easily. Psychiatric/Behavioral: Negative for memory loss. The patient does not have insomnia. Physical Exam  Visit Vitals  /70 (BP 1 Location: Right arm, BP Patient Position: Sitting)   Pulse 66   Ht 5' 3\" (1.6 m)   Wt 105 lb (47.6 kg)   SpO2 97%   BMI 18.60 kg/m²     Wt Readings from Last 3 Encounters:   02/01/19 105 lb (47.6 kg)   02/01/19 108 lb (49 kg)   07/27/18 108 lb (49 kg)      General - well developed, thin, wheelchair bound. Neck - JVP normal, thyroid nl  Cardiac - normal S1,S2, no murmurs, rubs or gallops. No clicks  Vascular - carotids without bruits, radials, femorals and pedal pulses equal bilateral  Lungs - clear to auscultation bilaterals, no rales ,wheezing or rhonchi  Abd - soft nontender  Extremities - no edema  Skin - no rash  Neuro - nonfocal  Psych - normal mood and affect    Cardiographics  ECG 9/2013 - SR, IVCD, PRWP, NSSTs, no previous tracing  EKG 10/21/14 - sinus rhythm, NSSTT abnormalities   Echo 6/18/15 - LVEF 35-40%  EKG 11/10/15 - SR, non specific ST-T abnormalities  EKG 3/31/17 - SR 71  EKG 7/17/18 - SR. Normal EKG. Echo 2/1/19 - LVEF 40-45%,     Written by Leopoldo Jenkins, as dictated by Dr. Jevon Nava.        Jevon Nava MD

## 2019-02-01 ENCOUNTER — OFFICE VISIT (OUTPATIENT)
Dept: CARDIOLOGY CLINIC | Age: 74
End: 2019-02-01

## 2019-02-01 ENCOUNTER — CLINICAL SUPPORT (OUTPATIENT)
Dept: CARDIOLOGY CLINIC | Age: 74
End: 2019-02-01

## 2019-02-01 VITALS
BODY MASS INDEX: 18.61 KG/M2 | SYSTOLIC BLOOD PRESSURE: 110 MMHG | HEART RATE: 66 BPM | HEIGHT: 63 IN | WEIGHT: 105 LBS | DIASTOLIC BLOOD PRESSURE: 70 MMHG | OXYGEN SATURATION: 97 %

## 2019-02-01 VITALS
BODY MASS INDEX: 19.14 KG/M2 | SYSTOLIC BLOOD PRESSURE: 100 MMHG | HEIGHT: 63 IN | DIASTOLIC BLOOD PRESSURE: 80 MMHG | WEIGHT: 108 LBS

## 2019-02-01 DIAGNOSIS — I42.8 NICM (NONISCHEMIC CARDIOMYOPATHY) (HCC): Primary | ICD-10-CM

## 2019-02-01 DIAGNOSIS — I42.8 NON-ISCHEMIC CARDIOMYOPATHY (HCC): Primary | ICD-10-CM

## 2019-02-01 DIAGNOSIS — M33.20 POLYMYOSITIS (HCC): ICD-10-CM

## 2019-02-01 DIAGNOSIS — G72.41 INCLUSION BODY MYOSITIS (IBM): ICD-10-CM

## 2019-02-01 RX ORDER — ERGOCALCIFEROL 1.25 MG/1
50000 CAPSULE ORAL
COMMUNITY

## 2019-02-01 NOTE — PROGRESS NOTES
Patient has no cardiac complaints today       Visit Vitals  /70 (BP 1 Location: Right arm, BP Patient Position: Sitting)   Pulse 66   Ht 5' 3\" (1.6 m)   Wt 105 lb (47.6 kg)   SpO2 97%   BMI 18.60 kg/m²

## 2019-02-07 RX ORDER — CARVEDILOL 3.12 MG/1
TABLET ORAL
Qty: 180 TAB | Refills: 3 | Status: SHIPPED | OUTPATIENT
Start: 2019-02-07 | End: 2020-05-04

## 2019-02-09 LAB
ECHO AO ROOT DIAM: 2.57 CM
ECHO EST RA PRESSURE: 3 MMHG
ECHO IVC SNIFF: 1.98 CM
ECHO LA MAJOR AXIS: 2.17 CM
ECHO LA TO AORTIC ROOT RATIO: 0.84
ECHO LV INTERNAL DIMENSION DIASTOLIC: 3.91 CM (ref 3.9–5.3)
ECHO LV INTERNAL DIMENSION SYSTOLIC: 3.32 CM
ECHO LV IVSD: 1.03 CM (ref 0.6–0.9)
ECHO LV MASS 2D: 122.4 G (ref 67–162)
ECHO LV MASS INDEX 2D: 83.2 G/M2 (ref 43–95)
ECHO LV POSTERIOR WALL DIASTOLIC: 0.81 CM (ref 0.6–0.9)
ECHO LVOT DIAM: 1.86 CM
ECHO MV A VELOCITY: 53.95 CM/S
ECHO MV AREA PHT: 3.4 CM2
ECHO MV E DECELERATION TIME (DT): 224.6 MS
ECHO MV E VELOCITY: 0.38 CM/S
ECHO MV E/A RATIO: 0.01
ECHO MV PRESSURE HALF TIME (PHT): 65.1 MS
ECHO PULMONARY ARTERY SYSTOLIC PRESSURE (PASP): 22.9 MMHG
ECHO RIGHT VENTRICULAR SYSTOLIC PRESSURE (RVSP): 22.9 MMHG
ECHO TV REGURGITANT MAX VELOCITY: 222.78 CM/S
ECHO TV REGURGITANT PEAK GRADIENT: 19.9 MMHG

## 2019-08-01 ENCOUNTER — OFFICE VISIT (OUTPATIENT)
Dept: CARDIOLOGY CLINIC | Age: 74
End: 2019-08-01

## 2019-08-01 VITALS
OXYGEN SATURATION: 98 % | DIASTOLIC BLOOD PRESSURE: 72 MMHG | SYSTOLIC BLOOD PRESSURE: 100 MMHG | BODY MASS INDEX: 18.25 KG/M2 | WEIGHT: 103 LBS | HEIGHT: 63 IN | HEART RATE: 68 BPM

## 2019-08-01 DIAGNOSIS — G72.41 INCLUSION BODY MYOSITIS (IBM): ICD-10-CM

## 2019-08-01 DIAGNOSIS — I42.8 NON-ISCHEMIC CARDIOMYOPATHY (HCC): Primary | ICD-10-CM

## 2019-08-01 DIAGNOSIS — Z99.3 WHEELCHAIR BOUND: ICD-10-CM

## 2019-08-01 DIAGNOSIS — R53.81 DEBILITY: ICD-10-CM

## 2019-08-01 DIAGNOSIS — I50.20 ACC/AHA STAGE B SYSTOLIC HEART FAILURE (HCC): ICD-10-CM

## 2019-08-01 NOTE — PROGRESS NOTES
Kaycee Dhaliwal [address]  [address]  [place], [address] [facility]    Office [phone]  Fax [phone]  Cell [phone]          Jackie Tee is a 68 y.o. female last seen by me 6 months ago. Assessment  Encounter Diagnoses   Name Primary?  Non-ischemic cardiomyopathy ([address] Yes    Inclusion body myositis (IBM)     ACC/AHA stage B systolic heart failure (HCC)     Debility     Wheelchair bound      Recommendations:    NICM in the setting of inclusion body myositis, stage B HF. LVEF has been in the 35-40% range, but 6 months ago it had increased slightly to 35-40%. Cardiac MRI 2014 did not demonstrate any obvious myocardial pathology. Her functional capacity is markedly limited due to her myositis. She is wheelchair bound. Continue low dose carvedilol. Repeat echo in 6 months. Her mental outlook remains very positive despite her disability from her myopathy. Apparently, Dr. Allen Montenegro is leaving [facility] and will be joining Leonor Kocher in [place]. Follow-up and Dispositions    · Return in about 6 months (around 2/1/2020). Subjective:    Jackie Tee describes interval progressive generalized weakness, particularly in her arms. She just saw neurology a few days ago. Otherwise she is doing well. She has no exertional sxs with routine activities. Patient denies any exertional chest pain, dyspnea, palpitations, syncope, orthopnea, edema or paroxysmal nocturnal dyspnea. She would like to gain more weight. She still describes mucous drainage in her throat. She says Zyrtec and Mucinex keep that in check. She has an aide who helps her around the house and drivers her to appointments. She is wheelchair bound.      Cardiac risk factors   HTN no  DM  no  Smoking no  Family hx of CAD yes, mother with CAD    Cardiac testing  Echo 8/14/13 ([facility]) - EF 35-40% global, septal dyskinesis  Lexiscan cardiolite 9/30/13 - normal Lexiscan , EF 47%  Cardiac MRI 6/2014 - normal myocardium, EF 49%    Echo 5/15/2014 - EF 35-40%, inferior AK  Echo 1/15/15 - EF 35-40%, inferior hypokinesis  Echo 6/18/15 - LVEF 35-40%  Echo 6/30/16 - EF 35 % to 40 %. Severe hypokinesis of the basal inferior and basal-mid  inferolateral wall(s). Grade 1 diastolic dysfunction. No significant change when compared to 18-Jun-2015. Echo 10/2/17 - EF 35-40%. G1DD. No change compared to study 6/2016. 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  UBIDECARENONE/VITAMIN E MIXED (COQ10  PO) Take  by mouth.  loperamide (IMMODIUM) 2 mg tablet Take 2 mg by mouth daily as needed for Diarrhea.  ascorbic acid (VITAMIN C) 500 mg tablet Take 500 mg by mouth daily.  folic acid (FOLVITE) 1 mg tablet Take  by mouth daily.  ACETAMINOPHEN/DIPHENHYDRAMINE (TYLENOL PM PO) Take 2 Tabs by mouth nightly.  cyanocobalamin (VITAMIN B-12) 1,000 mcg tablet Take 1,000 mcg by mouth daily. Allergies   Allergen Reactions    Macrobid [Nitrofurantoin Monohyd/M-Cryst] Hives    Morphine Other (comments)     Severe headache    Sulfa (Sulfonamide Antibiotics) Hives      Review of Systems  Constitutional: Negative for fever, chills, malaise/fatigue and diaphoresis. Respiratory: Negative for cough, hemoptysis, sputum production, shortness of breath and wheezing. Cardiovascular: Negative for chest pain, palpitations, orthopnea, claudication, leg swelling and PND. Gastrointestinal: Negative for heartburn, nausea, vomiting, blood in stool and melena. Genitourinary: Negative for dysuria and flank pain. Musculoskeletal: Negative for joint pain and back pain. Skin: Negative for rash. Neurological: Negative for focal weakness, seizures, loss of consciousness, and headaches. +generalized weakness  Endo/Heme/Allergies: Does not bruise/bleed easily. Psychiatric/Behavioral: Negative for memory loss. The patient does not have insomnia. Physical Exam  Visit Vitals  /72 (BP 1 Location: Left arm, BP Patient Position: Sitting)   Pulse 68   Ht 5' 3\" (1.6 m)   Wt 103 lb (46.7 kg)   SpO2 98%   BMI 18.25 kg/m²     Wt Readings from Last 3 Encounters:   08/01/19 103 lb (46.7 kg)   02/01/19 105 lb (47.6 kg)   02/01/19 108 lb (49 kg)      General - well developed, thin, wheelchair bound. Neck - JVP normal, thyroid nl  Cardiac - normal S1,S2, no murmurs, rubs or gallops. No clicks  Vascular - carotids without bruits, radials, femorals and pedal pulses equal bilateral  Lungs - clear to auscultation bilaterals, no rales ,wheezing or rhonchi  Abd - soft nontender  Extremities - trace to 1+ ankle edema  Skin - no rash  Neuro - nonfocal  Psych - normal mood and affect    Cardiographics  ECG 9/2013 - SR, IVCD, PRWP, NSSTs, no previous tracing  EKG 10/21/14 - sinus rhythm, NSSTT abnormalities   Echo 6/18/15 - LVEF 35-40%  EKG 11/10/15 - SR, non specific ST-T abnormalities  EKG 3/31/17 - SR 71  EKG 7/17/18 - SR. Normal EKG. Echo 2/1/19 - LVEF 40-45%,       Written by Catherine Brothers, as dictated by Phil Aguiar M.D.        Phil Aguiar MD

## 2019-08-01 NOTE — PROGRESS NOTES
Patient says she gets a little swelling in ankles    Patient says that she has drainage in throat     Visit Vitals  /72 (BP 1 Location: Left arm, BP Patient Position: Sitting)   Pulse 68   Ht 5' 3\" (1.6 m)   Wt 103 lb (46.7 kg)   SpO2 98%   BMI 18.25 kg/m²

## 2019-08-01 NOTE — LETTER
8/29/19 Patient: Sandeep Bowers YOB: 1945 Date of Visit: 8/1/2019 Ashley Nash MD 
[address] [place] [id number] VIA Facsimile: [phone] Dear Ashley Nash MD, Thank you for referring Ms. Lisa Maynard to [facility] for evaluation. My notes for this consultation are attached. If you have questions, please do not hesitate to call me. I look forward to following your patient along with you. Sincerely, Anson Neves MD

## 2019-11-26 ENCOUNTER — APPOINTMENT (OUTPATIENT)
Dept: GENERAL RADIOLOGY | Age: 74
End: 2019-11-26
Attending: EMERGENCY MEDICINE
Payer: MEDICARE

## 2019-11-26 ENCOUNTER — HOSPITAL ENCOUNTER (EMERGENCY)
Age: 74
Discharge: HOME | End: 2019-11-26
Attending: EMERGENCY MEDICINE
Payer: MEDICARE

## 2019-11-26 VITALS
WEIGHT: 103 LBS | OXYGEN SATURATION: 98 % | HEART RATE: 72 BPM | RESPIRATION RATE: 18 BRPM | DIASTOLIC BLOOD PRESSURE: 57 MMHG | TEMPERATURE: 97.9 F | BODY MASS INDEX: 18.25 KG/M2 | SYSTOLIC BLOOD PRESSURE: 118 MMHG

## 2019-11-26 DIAGNOSIS — S91.114A LACERATION OF LESSER TOE OF RIGHT FOOT WITHOUT FOREIGN BODY PRESENT OR DAMAGE TO NAIL, INITIAL ENCOUNTER: ICD-10-CM

## 2019-11-26 DIAGNOSIS — S09.90XA CLOSED HEAD INJURY, INITIAL ENCOUNTER: ICD-10-CM

## 2019-11-26 DIAGNOSIS — S90.30XA CONTUSION OF FOOT, UNSPECIFIED LATERALITY, INITIAL ENCOUNTER: Primary | ICD-10-CM

## 2019-11-26 PROCEDURE — 73630 X-RAY EXAM OF FOOT: CPT

## 2019-11-26 PROCEDURE — 74011000250 HC RX REV CODE- 250: Performed by: EMERGENCY MEDICINE

## 2019-11-26 PROCEDURE — 77030018836 HC SOL IRR NACL ICUM -A

## 2019-11-26 PROCEDURE — 99283 EMERGENCY DEPT VISIT LOW MDM: CPT

## 2019-11-26 PROCEDURE — 77030002916 HC SUT ETHLN J&J -A

## 2019-11-26 PROCEDURE — 75810000293 HC SIMP/SUPERF WND  RPR

## 2019-11-26 RX ORDER — CETIRIZINE HCL 10 MG
10 TABLET ORAL
COMMUNITY

## 2019-11-26 RX ORDER — LIDOCAINE HYDROCHLORIDE 10 MG/ML
10 INJECTION, SOLUTION EPIDURAL; INFILTRATION; INTRACAUDAL; PERINEURAL ONCE
Status: COMPLETED | OUTPATIENT
Start: 2019-11-26 | End: 2019-11-26

## 2019-11-26 RX ORDER — BACITRACIN 500 UNIT/G
1 PACKET (EA) TOPICAL
Status: COMPLETED | OUTPATIENT
Start: 2019-11-26 | End: 2019-11-26

## 2019-11-26 RX ADMIN — LIDOCAINE HYDROCHLORIDE 10 ML: 10 INJECTION, SOLUTION EPIDURAL; INFILTRATION; INTRACAUDAL; PERINEURAL at 15:41

## 2019-11-26 RX ADMIN — BACITRACIN 1 PACKET: 500 OINTMENT TOPICAL at 15:41

## 2019-11-26 NOTE — ED PROVIDER NOTES
50-year-old female with past medical history for T-cell lymphoma, cardiomyopathy, myositis, and polymyositis who is wheelchair-bound presents to the emergency room for evaluation of foot pain and head injury. This occurred just prior to arrival.  Patient has a motorized wheelchair. Patient accidentally because the wheelchair to go forward causing her feet and toes to bend in a hyperflexion position. Patient is reporting pain across the top of her foot. Patient also did strike her head on the wall. There was no loss of consciousness. No blurry vision or double vision. No nausea or vomiting. No ankle or leg pain. She is not taking anything for pain. She is declining pain medicine. Pain is worse with movement of the feet. No alleviating factors. Pain is currently 6 out of 10. Social hx  Nonsmoker  No alcohol    The history is provided by the patient. No  was used. Foot Injury    Pertinent negatives include no back pain and no neck pain. Head Injury    Pertinent negatives include no vomiting.         Past Medical History:   Diagnosis Date    [facility]    Dr Jessica Franco Cardiomyopathy [facility])     nonischemic    Inclusion body myositis (IBM)     Polymyositis ([facility] [address] dx 1997    Rx mtx/steroids    Vertigo        Past Surgical History:   Procedure Laterality Date    HX KYPHOPLASTY      HX TONSILLECTOMY      REMOVAL OF OVARIAN CYST(S)           Family History:   Problem Relation Age of Onset    Heart Disease Mother     Diabetes Mother     Heart Disease Father        Social History     Socioeconomic History    Marital status:      Spouse name: Not on file    Number of children: Not on file    Years of education: Not on file    Highest education level: Not on file   Occupational History    Not on file   Social Needs    Financial resource strain: Not on file    Food insecurity:     Worry: Not on file     Inability: Not on file   [facility] Transportation needs:     Medical: Not on file     Non-medical: Not on file   Tobacco Use    Smoking status: Never Smoker    Smokeless tobacco: Never Used   Substance and Sexual Activity    Alcohol use: No    Drug use: No    Sexual activity: Not on file   Lifestyle    Physical activity:     Days per week: Not on file     Minutes per session: Not on file    Stress: Not on file   Relationships    Social connections:     Talks on phone: Not on file     Gets together: Not on file     Attends Orthodoxy service: Not on file     Active member of club or organization: Not on file     Attends meetings of clubs or organizations: Not on file     Relationship status: Not on file    Intimate partner violence:     Fear of current or ex partner: Not on file     Emotionally abused: Not on file     Physically abused: Not on file     Forced sexual activity: Not on file   Other Topics Concern    Not on file   Social History Narrative    Not on file         ALLERGIES: Macrobid [nitrofurantoin monohyd/m-cryst]; Morphine; and Sulfa (sulfonamide antibiotics)    Review of Systems   Constitutional: Negative for chills and fever. Gastrointestinal: Negative for abdominal pain, nausea and vomiting. Musculoskeletal: Negative for back pain and neck pain. Skin: Positive for wound. Negative for color change. Neurological: Positive for headaches. Negative for dizziness. Vitals:    11/26/19 1433   BP: 121/69   Pulse: 76   Resp: 20   Temp: 97.9 °F (36.6 °C)   SpO2: 96%   Weight: 46.7 kg (103 lb)            Physical Exam  Vitals signs and nursing note reviewed. Constitutional:       Appearance: Normal appearance. HENT:      Head: Normocephalic. Comments: Forehead small gooseegg with abrasion. No pain to palpation     Nose: Nose normal.      Mouth/Throat:      Mouth: Mucous membranes are moist.   Eyes:      Conjunctiva/sclera: Conjunctivae normal.      Pupils: Pupils are equal, round, and reactive to light.    Neck: Musculoskeletal: Full passive range of motion without pain, normal range of motion and neck supple. Comments: No midline tenderness to palpation of cspine  Painless FROM of neck  Cardiovascular:      Rate and Rhythm: Normal rate and regular rhythm. Pulmonary:      Effort: Pulmonary effort is normal.      Breath sounds: Normal breath sounds. Musculoskeletal:      Comments: Feet bilaterally:  No redness, warmth or swelling. Tender along dorsum of childs foot mcp joints. Cap refill brisk < 3 seconds. 2+ dorsalis pedis  2nd toe right foot: volar surface ip joint small 3/4 cm laceration   Skin:     General: Skin is warm and dry. Capillary Refill: Capillary refill takes less than 2 seconds. Neurological:      Mental Status: She is alert and oriented to person, place, and time. Comments: Wheelchair bound   Psychiatric:         Mood and Affect: Mood normal.         Thought Content: Thought content normal.          MDM  Number of Diagnoses or Management Options  Diagnosis management comments: 71-year-old female presenting for bilateral foot pain and head injury after driving her wheelchair into [place]. Patient had no loss of consciousness. No nausea or vomiting. She is alert and oriented. She has a small laceration to the volar surface of the second toe on the right foot. No midline tenderness to cervical spine. P: xray, wound repair. 4:16 PM  Xray with no acute process. Wound repaired. Pt with no complaints of headache. Patient is awake, alert, with normal neurological exam for pain and improving symptoms. Given patient's age, physical exam findings, mechanism of injury, and improvement of symptoms during the observation period, there is no need for neuroimaging at this time. Will discharge the patient home with supportive care and follow-up with PCP in 1-2 days.     Patient and caregivers were educated on signs/symptoms of head injury, and told to return with significant changes in mental status, worsening headache, persistent vomiting, or other concerning symptoms. Patient's results have been reviewed with them. Patient and/or family have verbally conveyed their understanding and agreement of the patient's signs, symptoms, diagnosis, treatment and prognosis and additionally agree to follow up as recommended or return to the Emergency Room should their condition change prior to follow-up. Discharge instructions have also been provided to the patient with some educational information regarding their diagnosis as well a list of reasons why they would want to return to the ER prior to their follow-up appointment should their condition change. Amount and/or Complexity of Data Reviewed  Discuss the patient with other providers: yes (ER attending-[facility])    Patient Progress  Patient progress: stable         Wound Repair  Date/Time: 11/26/2019 4:19 PM  Performed by: [id number] [employer] provider: Magnant  Preparation: skin prepped with Betadine, skin prepped with ChloraPrep and sterile field established  Pre-procedure re-eval: Immediately prior to the procedure, the patient was reevaluated and found suitable for the planned procedure and any planned medications. Time out: Immediately prior to the procedure a time out was called to verify the correct patient, procedure, equipment, staff and marking as appropriate. .  Location details: right second toe  Wound length:2.5 cm or less  Anesthesia: local infiltration    Anesthesia:  Local Anesthetic: lidocaine 1% without epinephrine  Anesthetic total: 2 mL  Foreign bodies: no foreign bodies  Irrigation solution: saline  Irrigation method: syringe  Skin closure: 5-0 nylon  Number of sutures: 3  Technique: simple and interrupted  Approximation: close  Dressing: antibiotic ointment and 4x4  Patient tolerance: Patient tolerated the procedure well with no immediate complications  My total time at bedside, performing this procedure was 16-30 minutes. Pt case including HPI, PE, and all available lab and radiology results has been discussed with attending physician. Opportunity to evaluate patient has been provided to ER attending. Discharge and prescription plan has been agreed upon.

## 2019-11-26 NOTE — ED NOTES
Patient reclining in wheelchair. Right foot cleansed to find source of bleeding. .75cm laceration noted to bottom of second toe from impact with wall. Provider notified and new orders received.

## 2019-11-26 NOTE — ED TRIAGE NOTES
Patient arrives to treatment area via own motorized wheelchair. Patient states that while at [facility] this afternoon, she ran her chair into a wall, injuring bilateral feet when her toes were caught between the pedals and the wall. Patient states she then leaned forward to try to get her hand better positioned on her joystick, and struck her head on the same wall. Denies LOC. Patient tearful in triage. Patient bilateral feet notably purple, but patient states this is a normal variant for her. 1+ pedal pulses bilaterally.

## 2019-11-26 NOTE — ED NOTES
The patient was discharged home by provider in stable condition. The patient is alert and oriented, in no respiratory distress and discharge vital signs obtained. The patient's diagnosis, condition and treatment were explained. The patient expressed understanding. No prescriptions given. No work/school note given. A discharge plan has been developed. A  was not involved in the process. Aftercare instructions were given. Pt discharged from the ED via w/c with caregiver.

## 2019-11-26 NOTE — DISCHARGE INSTRUCTIONS
KEEP TOE BANDAGED FOR NEXT 48 HOURS. KEEP DRY FOR NEXT 48 HOURS. AFTER 2 DAYS,  [address] WITH GENTLE SOAP AND WATER AND APPLY ANTIBIOTIC OINTMENT. KEEP FINGER ELEVATED FOR NEXT 48 HOURS. PLACE ICE IN A BAG AND APPLY TO FEET FOR 20 MINUTES EACH HOUR FOR NEXT 50 HOURS. PERFORM AS OFTEN AS POSSIBLE. RETURN TO ER FOR ANY REDNESS, WARMTH, SWELLING, PUS, DISCHARGE, REDSTREAKS UP HAND OR ARM, FEVER OVER 101, HEADACHE, NAUSEA, VOMITING, CONFUSION. Renee Mort SUTURES OUT IN 14 DAYS. Cuts Closed With Stitches: Care Instructions  Your Care Instructions  A cut can happen anywhere on your body. The doctor used stitches to close the cut. Using stitches also helps the cut heal and reduces scarring. Sometimes pieces of tape called Steri-Strips are put over the stitches. If the cut went deep and through the skin, the doctor may have put in two layers of stitches. The deeper layer brings the deep part of the cut together. These stitches will dissolve and don't need to be removed. The stitches in the upper layer are the ones you see on the cut. You will probably have a bandage over the stitches. You will need to have the stitches removed, usually in 7 to 14 days. The doctor has checked you carefully, but problems can develop later. If you notice any problems or new symptoms, get medical treatment right away. Follow-up care is a key part of your treatment and safety. Be sure to make and go to all appointments, and call your doctor if you are having problems. It's also a good idea to know your test results and keep a list of the medicines you take. How can you care for yourself at home? · Keep the cut dry for the first 24 to 48 hours. After this, you can shower if your doctor okays it. Pat the cut dry. · Don't soak the cut, such as in a bathtub. Your doctor will tell you when it's safe to get the cut wet. · If your doctor told you how to care for your cut, follow your doctor's instructions.  If you did not get instructions, follow this general advice:  ? After the first 24 to 48 hours, wash around the cut with clean water 2 times a day. Don't use hydrogen peroxide or alcohol, which can slow healing. ? You may cover the cut with a thin layer of petroleum jelly, such as Vaseline, and a nonstick bandage. ? Apply more petroleum jelly and replace the bandage as needed. · Prop up the sore area on a pillow anytime you sit or lie down during the next 3 days. Try to keep it above the level of your heart. This will help reduce swelling. · Avoid any activity that could cause your cut to reopen. · Do not remove the stitches on your own. Your doctor will tell you when to come back to have the stitches removed. · Leave Steri-Strips on until they fall off. · Be safe with medicines. Read and follow all instructions on the label. ? If the doctor gave you a prescription medicine for pain, take it as prescribed. ? If you are not taking a prescription pain medicine, ask your doctor if you can take an over-the-counter medicine. When should you call for help? Call your doctor now or seek immediate medical care if:    · You have new pain, or your pain gets worse.     · The skin near the cut is cold or pale or changes color.     · You have tingling, weakness, or numbness near the cut.     · The cut starts to bleed, and blood soaks through the bandage. Oozing small amounts of blood is normal.     · You have trouble moving the area near the cut.     · You have symptoms of infection, such as:  ? Increased pain, swelling, warmth, or redness around the cut.  ? Red streaks leading from the cut.  ? Pus draining from the cut.  ? A fever.    Watch closely for changes in your health, and be sure to contact your doctor if:    · The cut reopens.     · You do not get better as expected. Where can you learn more? Go to http://suzie-amilcar.info/.   Enter R217 in the search box to learn more about \"Cuts Closed With Stitches: Care Instructions. \"  Current as of: June 26, 2019  Content Version: 12.2  © 9707-5462 [employer]. Care instructions adapted under license by [employer] (which disclaims liability or warranty for this information). If you have questions about a medical condition or this instruction, always ask your healthcare professional. [address] any warranty or liability for your use of this information. Learning About a Closed Head Injury  What is a closed head injury? A closed head injury happens when your head gets hit hard. The strong force of the blow causes your brain to shake in your skull. This movement can cause the brain to bruise, swell, or tear. Sometimes nerves or blood vessels also get damaged. This can cause bleeding in or around the brain. A concussion is a type of closed head injury. What are the symptoms? If you have a mild concussion, you may have a mild headache or feel \"not quite right. \" These symptoms are common. They usually go away over a few days to 4 weeks. But sometimes after a concussion, you feel like you can't function as well as before the injury. And you have new symptoms. This is called postconcussive syndrome. You may:  · Find it harder to solve problems, think, concentrate, or remember. · Have headaches. · Have changes in your sleep patterns, such as not being able to sleep or sleeping all the time. · Have changes in your personality. · Not be interested in your usual activities. · Feel angry or anxious without a clear reason. · Lose your sense of taste or smell. · Be dizzy, lightheaded, or unsteady. It may be hard to stand or walk. How is a closed head injury treated? Any person who may have a concussion needs to see a doctor. Some people have to stay in the hospital to be watched. Others can go home safely. If you go home, follow your doctor's instructions.  He or she will tell you if you need someone to watch you closely for the next 24 hours or longer. Rest is the best treatment. Get plenty of sleep at night. And try to rest during the day. · Avoid activities that are physically or mentally demanding. These include housework, exercise, and schoolwork. And don't play video games, send text messages, or use the computer. You may need to change your school or work schedule to be able to avoid these activities. · Ask your doctor when it's okay to drive, ride a bike, or operate machinery. · Take an over-the-counter pain medicine, such as acetaminophen (Tylenol), ibuprofen (Advil, Motrin), or naproxen (Aleve). Be safe with medicines. Read and follow all instructions on the label. · Check with your doctor before you use any other medicines for pain. · Do not drink alcohol or use illegal drugs. They can slow recovery. They can also increase your risk of getting a second head injury. Follow-up care is a key part of your treatment and safety. Be sure to make and go to all appointments, and call your doctor if you are having problems. It's also a good idea to know your test results and keep a list of the medicines you take. Where can you learn more? Go to http://suzie-amilcar.info/. Enter E235 in the search box to learn more about \"Learning About a Closed Head Injury. \"  Current as of: March 28, 2019  Content Version: 12.2  © 1428-8675 [employer]. Care instructions adapted under license by [employer] (which disclaims liability or warranty for this information). If you have questions about a medical condition or this instruction, always ask your healthcare professional. [place] [address] any warranty or liability for your use of this information. Patient Education        Contusion: Care Instructions  Your Care Instructions    Contusion is the medical term for a bruise. It is the result of a direct blow or an impact, such as a fall. Contusions are common sports injuries.   Most people think of a bruise as a black-and-blue spot. This happens when small blood vessels get torn and leak blood under the skin. But bones, muscles, and organs can also get bruised. This may damage deep tissues but not cause a bruise you can see. The doctor will do a physical exam to find the location of your contusion. You may also have tests to make sure you do not have a more serious injury, such as a broken bone or nerve damage. These may include X-rays or other imaging tests like a CT scan or MRI. Deep-tissue contusions may cause pain and swelling. But if there is no serious damage, they will often get better in a few weeks with home treatment. The doctor has checked you carefully, but problems can develop later. If you notice any problems or new symptoms, get medical treatment right away. Follow-up care is a key part of your treatment and safety. Be sure to make and go to all appointments, and call your doctor if you are having problems. It's also a good idea to know your test results and keep a list of the medicines you take. How can you care for yourself at home? · Put ice or a cold pack on the sore area for 10 to 20 minutes at a time to stop swelling. Put a thin cloth between the ice pack and your skin. · Be safe with medicines. Read and follow all instructions on the label. ? If the doctor gave you a prescription medicine for pain, take it as prescribed. ? If you are not taking a prescription pain medicine, ask your doctor if you can take an over-the-counter medicine. · If you can, prop up the sore area on pillows as much as possible for the next few days. Try to keep the sore area above the level of your heart. When should you call for help? Call your doctor now or seek immediate medical care if:    · Your pain gets worse.     · You have new or worse swelling.     · You have tingling, weakness, or numbness in the area near the contusion.     · The area near the contusion is cold or pale.  Watch closely for changes in your health, and be sure to contact your doctor if:    · You do not get better as expected. Where can you learn more? Go to http://suzie-amilcar.info/. Enter T312 in the search box to learn more about \"Contusion: Care Instructions. \"  Current as of: June 26, 2019  Content Version: 12.2  © 2936-8793 [employer]. Care instructions adapted under license by [employer] (which disclaims liability or warranty for this information). If you have questions about a medical condition or this instruction, always ask your healthcare professional. [place] [address] any warranty or liability for your use of this information.

## 2019-12-10 ENCOUNTER — HOSPITAL ENCOUNTER (EMERGENCY)
Age: 74
Discharge: HOME | End: 2019-12-10
Attending: STUDENT IN AN ORGANIZED HEALTH CARE EDUCATION/TRAINING PROGRAM
Payer: MEDICARE

## 2019-12-10 VITALS
HEART RATE: 76 BPM | RESPIRATION RATE: 16 BRPM | DIASTOLIC BLOOD PRESSURE: 70 MMHG | TEMPERATURE: 98.4 F | SYSTOLIC BLOOD PRESSURE: 140 MMHG | OXYGEN SATURATION: 96 %

## 2019-12-10 DIAGNOSIS — Z48.02 VISIT FOR SUTURE REMOVAL: Primary | ICD-10-CM

## 2019-12-10 PROCEDURE — 75810000275 HC EMERGENCY DEPT VISIT NO LEVEL OF CARE

## 2019-12-10 NOTE — ED PROVIDER NOTES
Patient is a 25-year-old female with history of adult T-cell lymphoma, cardiomyopathy, polymyositis, who presents today for suture removal from the right foot second toe. Sutures were placed 14 days ago. Denies any fevers chills or drainage. No other complaints.            Past Medical History:   Diagnosis Date    [facility]    Dr Claribel Navaror Cardiomyopathy [facility])     nonischemic    Inclusion body myositis (IBM)     Polymyositis ([facility] dx 1997    Rx mtx/steroids    Vertigo        Past Surgical History:   Procedure Laterality Date    HX KYPHOPLASTY      HX TONSILLECTOMY      REMOVAL OF OVARIAN CYST(S)           Family History:   Problem Relation Age of Onset    Heart Disease Mother     Diabetes Mother     Heart Disease Father        Social History     Socioeconomic History    Marital status:      Spouse name: Not on file    Number of children: Not on file    Years of education: Not on file    Highest education level: Not on file   Occupational History    Not on file   Social Needs    Financial resource strain: Not on file    Food insecurity:     Worry: Not on file     Inability: Not on file    Transportation needs:     Medical: Not on file     Non-medical: Not on file   Tobacco Use    Smoking status: Never Smoker    Smokeless tobacco: Never Used   Substance and Sexual Activity    Alcohol use: No    Drug use: No    Sexual activity: Not on file   Lifestyle    Physical activity:     Days per week: Not on file     Minutes per session: Not on file    Stress: Not on file   Relationships    Social connections:     Talks on phone: Not on file     Gets together: Not on file     Attends Denominational service: Not on file     Active member of club or organization: Not on file     Attends meetings of clubs or organizations: Not on file     Relationship status: Not on file    Intimate partner violence:     Fear of current or ex partner: Not on file     Emotionally abused: Not on file     Physically abused: Not on file     Forced sexual activity: Not on file   Other Topics Concern    Not on file   Social History Narrative    Not on file         ALLERGIES: Macrobid [nitrofurantoin monohyd/m-cryst]; Morphine; and Sulfa (sulfonamide antibiotics)    Review of Systems   Skin: Positive for wound. All other systems reviewed and are negative. Vitals:    12/10/19 1439   BP: 140/70   Pulse: 76   Resp: 16   Temp: 98.4 °F (36.9 °C)   SpO2: 96%            Physical Exam  Vitals signs and nursing note reviewed. Constitutional:       Appearance: Normal appearance. Skin:     General: Skin is warm. Comments: Suture to the volar aspect of the second toe right foot. No drainage. Neurological:      Mental Status: She is alert and oriented to person, place, and time. Psychiatric:         Mood and Affect: Mood normal.         Behavior: Behavior normal.          MDM       Suture/Staple Removal  Date/Time: 12/10/2019 2:52 PM  Performed by: Sheron Cordoba NP  Authorized by: Sehron Cordoba NP     Consent:     Consent obtained:  Verbal    Consent given by:  Patient    Risks discussed:  Wound separation    Alternatives discussed:  No treatment  Location:     Location:  Lower extremity    Lower extremity location:  Toe    Toe location:  R second toe  Procedure details:     Wound appearance:  No signs of infection, clean and good wound healing    Number of sutures removed:  2  Post-procedure details:     Post-removal:  Steri-Strips applied    Patient tolerance of procedure: Tolerated well, no immediate complications  Comments:      1 area of dehiscence noted. Steri strip applied.

## 2019-12-10 NOTE — DISCHARGE INSTRUCTIONS
Patient Education        Learning About Stitches and Staples Removal  When are stitches and staples removed? Your doctor will tell you when to have your stitches or staples removed, usually in 7 to 14 days. How long you'll be told to wait will depend on things like where the wound is located, how big and how deep the wound is, and what your general health is like. Do not remove the stitches on your own. Stitches on the face are usually removed within a week. But stitches and staples on other areas of the body, such as on the back or belly or over a joint, may need to stay in place longer, often a week or two. Be sure to follow your doctor's instructions. How are stitches and staples removed? It usually doesn't hurt when the doctor removes the stitches or staples. You may feel a tug as each stitch or staple is removed. · You will either be seated or lying down. · To remove stitches, the doctor will use scissors to cut each of the knots and then pull the threads out. · To remove staples, the doctor will use a tool to take out the staples one at a time. · The area may still feel tender after the stitches or staples are gone. But it should feel better within a few minutes or up to a few hours. What can you expect after stitches and staples are removed? Depending on the type and location of the cut, you will have a scar. Scars usually fade over time. Keep the area clean, but you won't need a bandage. When should you call for help? Call your doctor now or seek immediate medical care if :  · You have new pain, or your pain gets worse. · You have trouble moving the area near the scar. · You have symptoms of infection, such as:  ? Increased pain, swelling, warmth, or redness around the scar. ? Red streaks leading from the scar. ? Pus draining from the scar. ? A fever. Watch closely for changes in your health, and be sure to contact your doctor if:  · The scar opens.   · You do not get better as expected. Follow-up care is a key part of your treatment and safety. Be sure to make and go to all appointments, and call your doctor if you do not get better as expected. It's also a good idea to keep a list of the medicines you take. Where can you learn more? Go to http://suzie-amilcar.info/. Enter W302 in the search box to learn more about \"Learning About Stitches and Staples Removal.\"  Current as of: June 26, 2019  Content Version: 12.2  © 9053-5001 [employer]. Care instructions adapted under license by [employer] (which disclaims liability or warranty for this information). If you have questions about a medical condition or this instruction, always ask your healthcare professional. [address] any warranty or liability for your use of this information.

## 2020-02-04 ENCOUNTER — OFFICE VISIT (OUTPATIENT)
Dept: CARDIOLOGY CLINIC | Age: 75
End: 2020-02-04

## 2020-02-04 VITALS
WEIGHT: 105 LBS | HEART RATE: 60 BPM | SYSTOLIC BLOOD PRESSURE: 122 MMHG | OXYGEN SATURATION: 96 % | RESPIRATION RATE: 20 BRPM | HEIGHT: 63 IN | BODY MASS INDEX: 18.61 KG/M2 | DIASTOLIC BLOOD PRESSURE: 72 MMHG

## 2020-02-04 DIAGNOSIS — G72.41 INCLUSION BODY MYOSITIS (IBM): ICD-10-CM

## 2020-02-04 DIAGNOSIS — R53.81 DEBILITY: ICD-10-CM

## 2020-02-04 DIAGNOSIS — I50.20 ACC/AHA STAGE B SYSTOLIC HEART FAILURE (HCC): ICD-10-CM

## 2020-02-04 DIAGNOSIS — M33.20 POLYMYOSITIS (HCC): ICD-10-CM

## 2020-02-04 DIAGNOSIS — I42.8 NICM (NONISCHEMIC CARDIOMYOPATHY) (HCC): Primary | ICD-10-CM

## 2020-02-04 DIAGNOSIS — C91.51 ADULT T-CELL LEUKEMIA/LYMPHOMA IN REMISSION (HCC): ICD-10-CM

## 2020-02-04 NOTE — LETTER
2/4/20 Patient: Ginger Flores YOB: 1945 Date of Visit: 2/4/2020 Ayaka Watkins MD 
[address] [place] [address] [id number] VIA Facsimile: [phone] Dear Ayaka Watkins MD, Thank you for referring Ms. Michelle Pereira to [facility] for evaluation. My notes for this consultation are attached. If you have questions, please do not hesitate to call me. I look forward to following your patient along with you. Sincerely, Darwin Arguelles MD

## 2020-02-04 NOTE — PROGRESS NOTES
Visit Vitals  /72   Pulse 60   Resp 20   Ht 5' 3\" (1.6 m)   Wt 105 lb (47.6 kg)   SpO2 96%   BMI 18.60 kg/m²     Denies CP,SOB,edema.

## 2020-02-04 NOTE — PROGRESS NOTES
Chief Complaint   Patient presents with    Cardiomyopathy    CHF     Visit Vitals  Ht 5' 3\" (1.6 m)   BMI 18.25 kg/m²     Patient in offices states  no cardiac complaints. No hospital stays.     No cardiac refills

## 2020-05-04 RX ORDER — CARVEDILOL 3.12 MG/1
TABLET ORAL
Qty: 180 TAB | Refills: 3 | Status: SHIPPED | OUTPATIENT
Start: 2020-05-04

## 2020-09-14 LAB — SARS-COV-2, NAA: NOT DETECTED

## 2020-09-21 NOTE — PROGRESS NOTES
Kaycee Polk, [address]  [address],  [address]  [place], [address] [facility]    Office [phone]  Fax [phone]  Cell [phone]          Ann Ulloa is a 69 y.o. female last seen by me 7 months ago. Assessment  Encounter Diagnoses   Name Primary?  Dilated cardiomyopathy (HCC) Yes    ACC/AHA stage B systolic heart failure (HCC)     Inclusion body myositis (IBM)      Recommendations:    NICM in the setting of inclusion body myositis. LVEF has been in the 35-40% range dating back to 2013. Updated echo Feb 2020 unchanged. Cardiac MRI 2014 did not demonstrate any obvious myocardial pathology. Her functional capacity is markedly limited due to her myositis. She is wheelchair bound. No s/sxs of decompensated HF.   - Continue low dose carvedilol. Would not add ACE/ARB at this time d/t low-normal BP.  - Repeat echo in 6 months    Inclusion body myositis, slowly progressive. She is followed by [facility] neurology. Recent bronchitis, gradually improving. No wheezing on exam today. Recent PFT's abnormal although not surprising. This is likely d/t neuromuscular pathology. Her mental outlook remains very positive despite her disability from her myopathy. Follow-up and Dispositions    · Return in about 6 months (around 3/22/2021). Subjective:    Ann Ulloa reports that she has had bronchitis for 2 weeks w/ a productive cough. She states that she was on antibiotics for 10 days and was also on steroids. Had an X-ray done and lungs were apparently clear. She states that she has been steadily improving recently, sleeping through last night w/o waking up to cough. Patient denies any exertional chest pain, palpitations, syncope, orthopnea, edema or paroxysmal nocturnal dyspnea. Her physical activity is greatly limited by her progressive myopathy. She reports that she had evaluation done by a respiratory therapist w/ apparently poor results.  Will be seeing a pulmonologist at [facility]. Not on oxygen. Inclusion body myositis followed by Dr. Trevor Mcmahon at [facility]. She states that she has been getting weaker, primarily on the right side. Of note, she has an aide who helps her around the house and drivers her to appointments. She is wheelchair bound and requires full assistance for transfers. Her myopathy is gradually worsening. Cardiac risk factors   HTN no  DM  no  Smoking no  Family hx of CAD yes, mother with CAD    Cardiac testing  Echo 8/14/13 ([facility]) - EF 35-40% global, septal dyskinesis  Lexiscan cardiolite 9/30/13 - normal Lexiscan , EF 47%  Cardiac MRI 6/2014 - normal myocardium, EF 49%    Echo 5/15/2014 - EF 35-40%, inferior AK  Echo 1/15/15 - EF 35-40%, inferior hypokinesis  Echo 6/18/15 - LVEF 35-40%  Echo 6/30/16 - EF 35 % to 40 %. Severe hypokinesis of the basal inferior and basal-mid  inferolateral wall(s). Grade 1 diastolic dysfunction. No significant change when compared to 18-Jun-2015. Echo 10/2/17 - EF 35-40%. G1DD. No change compared to study 6/2016. 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 dx 1997    Rx mtx/steroids    Vertigo         Current Outpatient Medications   Medication Sig Dispense Refill    carvediloL (COREG) 3.125 mg tablet TAKE 1 TABLET BY MOUTH TWICE A  Tab 3    cetirizine (ZYRTEC) 10 mg tablet Take 10 mg by mouth.  ergocalciferol (VITAMIN D2) 50,000 unit capsule Take 50,000 Units by mouth every seven (7) days.  UBIDECARENONE/VITAMIN E MIXED (COQ10  PO) Take  by mouth.  folic acid (FOLVITE) 1 mg tablet Take  by mouth daily.  loperamide (IMMODIUM) 2 mg tablet Take 2 mg by mouth daily as needed for Diarrhea.  ascorbic acid (VITAMIN C) 500 mg tablet Take 500 mg by mouth daily.  cyanocobalamin (VITAMIN B-12) 1,000 mcg tablet Take 1,000 mcg by mouth daily. Allergies   Allergen Reactions    Macrobid [Nitrofurantoin Monohyd/M-Cryst] Hives    Morphine Other (comments)     Severe headache    Sulfa (Sulfonamide Antibiotics) Hives      Review of Systems  Constitutional: Negative for fever, chills, malaise/fatigue and diaphoresis. Respiratory: Negative for hemoptysis, sputum production, shortness of breath and wheezing. +productive cough (bronchitis)  Cardiovascular: Negative for chest pain, palpitations, orthopnea, claudication, leg swelling and PND. Gastrointestinal: Negative for heartburn, nausea, vomiting, blood in stool and melena. Genitourinary: Negative for dysuria and flank pain. Musculoskeletal: Negative for joint pain and back pain. Skin: Negative for rash. Neurological: Negative for focal weakness, seizures, loss of consciousness, and headaches. Endo/Heme/Allergies: Does not bruise/bleed easily. Psychiatric/Behavioral: Negative for memory loss. The patient does not have insomnia. Physical Exam  Visit Vitals  /62     Wt Readings from Last 3 Encounters:   02/04/20 105 lb (47.6 kg)   11/26/19 103 lb (46.7 kg)   08/01/19 103 lb (46.7 kg)      General - well developed, thin, wheelchair bound. Neck - JVP normal, thyroid nl  Cardiac - normal S1,S2, no murmurs, rubs or gallops. No clicks  Vascular - carotids without bruits, radials, femorals and pedal pulses equal bilateral  Lungs - clear to auscultation bilaterals, no rales ,wheezing or rhonchi  Abd - soft nontender  Extremities - trace to 1+ ankle edema  Skin - no rash  Neuro - nonfocal  Psych - normal mood and affect    Cardiographics  ECG 9/2013 - SR, IVCD, PRWP, NSSTs, no previous tracing  EKG 10/21/14 - sinus rhythm, NSSTT abnormalities   Echo 6/18/15 - LVEF 35-40%  EKG 11/10/15 - SR, non specific ST-T abnormalities  EKG 3/31/17 - SR 71  EKG 7/17/18 - SR. Normal EKG.   Echo 2/1/19 - LVEF 40-45%  EKG 2/4/20 - SR 64, normal  Echo 2/4/20 - 35-40%, global HK        Written by Kika Madrigal, as dictated by Dee Sterling M.D.      Dee Sterling MD

## 2020-09-22 ENCOUNTER — OFFICE VISIT (OUTPATIENT)
Dept: CARDIOLOGY CLINIC | Age: 75
End: 2020-09-22
Payer: MEDICARE

## 2020-09-22 VITALS — SYSTOLIC BLOOD PRESSURE: 108 MMHG | DIASTOLIC BLOOD PRESSURE: 62 MMHG

## 2020-09-22 DIAGNOSIS — I42.0 DILATED CARDIOMYOPATHY (HCC): Primary | ICD-10-CM

## 2020-09-22 DIAGNOSIS — I50.20 ACC/AHA STAGE B SYSTOLIC HEART FAILURE (HCC): ICD-10-CM

## 2020-09-22 DIAGNOSIS — G72.41 INCLUSION BODY MYOSITIS (IBM): ICD-10-CM

## 2020-09-22 PROCEDURE — G8427 DOCREV CUR MEDS BY ELIG CLIN: HCPCS | Performed by: SPECIALIST

## 2020-09-22 PROCEDURE — 99214 OFFICE O/P EST MOD 30 MIN: CPT | Performed by: SPECIALIST

## 2020-09-22 PROCEDURE — G8536 NO DOC ELDER MAL SCRN: HCPCS | Performed by: SPECIALIST

## 2020-09-22 PROCEDURE — G0463 HOSPITAL OUTPT CLINIC VISIT: HCPCS | Performed by: SPECIALIST

## 2020-09-22 PROCEDURE — 1090F PRES/ABSN URINE INCON ASSESS: CPT | Performed by: SPECIALIST

## 2020-09-22 PROCEDURE — G8420 CALC BMI NORM PARAMETERS: HCPCS | Performed by: SPECIALIST

## 2020-09-22 PROCEDURE — G8432 DEP SCR NOT DOC, RNG: HCPCS | Performed by: SPECIALIST

## 2020-09-22 PROCEDURE — G8400 PT W/DXA NO RESULTS DOC: HCPCS | Performed by: SPECIALIST

## 2020-09-22 PROCEDURE — 3017F COLORECTAL CA SCREEN DOC REV: CPT | Performed by: SPECIALIST

## 2020-09-22 PROCEDURE — 1101F PT FALLS ASSESS-DOCD LE1/YR: CPT | Performed by: SPECIALIST

## 2020-09-22 NOTE — LETTER
9/22/20 Patient: Anne-Marie Andrew YOB: 1945 Date of Visit: 9/22/2020 Billy Madrigal MD 
[address] [place] [address] [phone] VIA Facsimile: [phone] Dear Billy Madrigal MD, Thank you for referring Ms. [place] to [facility] for evaluation. My notes for this consultation are attached. If you have questions, please do not hesitate to call me. I look forward to following your patient along with you. Sincerely, Brett Estevez MD

## 2022-03-19 PROBLEM — I50.20 ACC/AHA STAGE B SYSTOLIC HEART FAILURE (HCC): Status: ACTIVE | Noted: 2018-07-28

## 2023-05-20 RX ORDER — ASCORBIC ACID 500 MG
500 TABLET ORAL DAILY
COMMUNITY

## 2023-05-20 RX ORDER — ERGOCALCIFEROL 1.25 MG/1
50000 CAPSULE ORAL
COMMUNITY

## 2023-05-20 RX ORDER — CETIRIZINE HYDROCHLORIDE 10 MG/1
10 TABLET ORAL
COMMUNITY

## 2023-05-20 RX ORDER — FOLIC ACID 1 MG/1
TABLET ORAL DAILY
COMMUNITY

## 2023-05-20 RX ORDER — LOPERAMIDE HYDROCHLORIDE 2 MG/1
2 TABLET ORAL DAILY PRN
COMMUNITY

## 2023-05-20 RX ORDER — CARVEDILOL 3.12 MG/1
1 TABLET ORAL 2 TIMES DAILY
COMMUNITY
Start: 2020-05-04

## 2023-08-01 NOTE — PROGRESS NOTES
Davion Marino is a 69 y.o. female  Chief Complaint   Patient presents with    Follow-up     Health Maintenance Due   Topic Date Due    Hepatitis C Screening  1945    DTaP/Tdap/Td series (1 - Tdap) 12/17/1967    Shingrix Vaccine Age 50> (1 of 2) 12/17/1996    Breast Cancer Screen Mammogram  12/17/1996    FOBT Q1Y Age 50-75  12/17/1996    GLAUCOMA SCREENING Q2Y  12/17/2011    Bone Densitometry (Dexa) Screening  12/17/2011    Pneumococcal 65+ years (1 of 1 - PPSV23) 12/17/2011    Pneumococcal 65+ yrs at Risk Vaccine (1 of 2 - PCV13) 12/17/2011    Medicare Yearly Exam  03/14/2018    Flu Vaccine (1) 09/01/2020     Visit Vitals  /62     1. Have you been to the ER, urgent care clinic since your last visit? Hospitalized since your last visit? No     2. Have you seen or consulted any other health care providers outside of the [address] [place] [address] since your last visit? Include any pap smears or colon screening. Yes, neurologist at [place] [id number] and pcp    Just finished a steroid because she had bronchitis. declines

## 2024-01-01 ENCOUNTER — APPOINTMENT (OUTPATIENT)
Facility: HOSPITAL | Age: 79
DRG: 177 | End: 2024-01-01
Payer: MEDICARE

## 2024-01-01 ENCOUNTER — HOSPITAL ENCOUNTER (INPATIENT)
Facility: HOSPITAL | Age: 79
LOS: 5 days | DRG: 177 | End: 2024-09-17
Attending: EMERGENCY MEDICINE | Admitting: INTERNAL MEDICINE
Payer: MEDICARE

## 2024-01-01 VITALS
OXYGEN SATURATION: 92 % | TEMPERATURE: 97.2 F | RESPIRATION RATE: 12 BRPM | WEIGHT: 79.37 LBS | SYSTOLIC BLOOD PRESSURE: 94 MMHG | HEART RATE: 59 BPM | DIASTOLIC BLOOD PRESSURE: 55 MMHG | BODY MASS INDEX: 13.55 KG/M2 | HEIGHT: 64 IN

## 2024-01-01 DIAGNOSIS — J96.01 ACUTE RESPIRATORY FAILURE WITH HYPOXIA (HCC): Primary | ICD-10-CM

## 2024-01-01 DIAGNOSIS — J84.9 BILATERAL INTERSTITIAL PNEUMONIA (HCC): ICD-10-CM

## 2024-01-01 LAB
ALBUMIN SERPL-MCNC: 2.5 G/DL (ref 3.5–5)
ALBUMIN SERPL-MCNC: 3.5 G/DL (ref 3.5–5)
ALBUMIN/GLOB SERPL: 0.8 (ref 1.1–2.2)
ALBUMIN/GLOB SERPL: 0.9 (ref 1.1–2.2)
ALP SERPL-CCNC: 44 U/L (ref 45–117)
ALP SERPL-CCNC: 69 U/L (ref 45–117)
ALT SERPL-CCNC: 18 U/L (ref 12–78)
ALT SERPL-CCNC: 19 U/L (ref 12–78)
ANION GAP SERPL CALC-SCNC: 11 MMOL/L (ref 2–12)
ANION GAP SERPL CALC-SCNC: 2 MMOL/L (ref 2–12)
ANION GAP SERPL CALC-SCNC: 7 MMOL/L (ref 2–12)
ANION GAP SERPL CALC-SCNC: 7 MMOL/L (ref 2–12)
APPEARANCE UR: CLEAR
APTT PPP: 23.9 SEC (ref 22.1–31)
ARTERIAL PATENCY WRIST A: ABNORMAL
AST SERPL-CCNC: 25 U/L (ref 15–37)
AST SERPL-CCNC: 68 U/L (ref 15–37)
B PERT DNA SPEC QL NAA+PROBE: NOT DETECTED
BACTERIA SPEC CULT: NORMAL
BACTERIA SPEC CULT: NORMAL
BACTERIA URNS QL MICRO: ABNORMAL /HPF
BASE EXCESS BLDA CALC-SCNC: 4.2 MMOL/L
BASOPHILS # BLD: 0 K/UL (ref 0–0.1)
BASOPHILS # BLD: 0 K/UL (ref 0–0.1)
BASOPHILS # BLD: 0.1 K/UL (ref 0–0.1)
BASOPHILS NFR BLD: 0 % (ref 0–1)
BASOPHILS NFR BLD: 1 % (ref 0–1)
BASOPHILS NFR BLD: 1 % (ref 0–1)
BDY SITE: ABNORMAL
BILIRUB SERPL-MCNC: 0.7 MG/DL (ref 0.2–1)
BILIRUB SERPL-MCNC: 0.7 MG/DL (ref 0.2–1)
BILIRUB UR QL: NEGATIVE
BORDETELLA PARAPERTUSSIS BY PCR: NOT DETECTED
BUN SERPL-MCNC: 10 MG/DL (ref 6–20)
BUN SERPL-MCNC: 11 MG/DL (ref 6–20)
BUN SERPL-MCNC: 14 MG/DL (ref 6–20)
BUN SERPL-MCNC: <1 MG/DL (ref 6–20)
BUN/CREAT SERPL: 74 (ref 12–20)
BUN/CREAT SERPL: ABNORMAL (ref 12–20)
C PNEUM DNA SPEC QL NAA+PROBE: NOT DETECTED
CALCIUM SERPL-MCNC: 7.7 MG/DL (ref 8.5–10.1)
CALCIUM SERPL-MCNC: 8.2 MG/DL (ref 8.5–10.1)
CALCIUM SERPL-MCNC: 8.2 MG/DL (ref 8.5–10.1)
CALCIUM SERPL-MCNC: 9.2 MG/DL (ref 8.5–10.1)
CHLORIDE SERPL-SCNC: 104 MMOL/L (ref 97–108)
CHLORIDE SERPL-SCNC: 104 MMOL/L (ref 97–108)
CHLORIDE SERPL-SCNC: 108 MMOL/L (ref 97–108)
CHLORIDE SERPL-SCNC: 111 MMOL/L (ref 97–108)
CK SERPL-CCNC: 191 U/L (ref 26–192)
CK SERPL-CCNC: 80 U/L (ref 26–192)
CO2 SERPL-SCNC: 23 MMOL/L (ref 21–32)
CO2 SERPL-SCNC: 28 MMOL/L (ref 21–32)
CO2 SERPL-SCNC: 32 MMOL/L (ref 21–32)
CO2 SERPL-SCNC: 34 MMOL/L (ref 21–32)
COLOR UR: ABNORMAL
CREAT SERPL-MCNC: 0.19 MG/DL (ref 0.55–1.02)
CREAT SERPL-MCNC: <0.15 MG/DL (ref 0.55–1.02)
CRP SERPL-MCNC: 13.2 MG/DL (ref 0–0.3)
CRP SERPL-MCNC: <0.29 MG/DL (ref 0–0.3)
DIFFERENTIAL METHOD BLD: ABNORMAL
EKG ATRIAL RATE: 101 BPM
EKG DIAGNOSIS: NORMAL
EKG P AXIS: 51 DEGREES
EKG P-R INTERVAL: 146 MS
EKG Q-T INTERVAL: 350 MS
EKG QRS DURATION: 70 MS
EKG QTC CALCULATION (BAZETT): 453 MS
EKG R AXIS: 34 DEGREES
EKG T AXIS: 91 DEGREES
EKG VENTRICULAR RATE: 101 BPM
EOSINOPHIL # BLD: 0 K/UL (ref 0–0.4)
EOSINOPHIL NFR BLD: 0 % (ref 0–7)
EPITH CASTS URNS QL MICRO: ABNORMAL /LPF
ERYTHROCYTE [DISTWIDTH] IN BLOOD BY AUTOMATED COUNT: 16.2 % (ref 11.5–14.5)
ERYTHROCYTE [DISTWIDTH] IN BLOOD BY AUTOMATED COUNT: 16.2 % (ref 11.5–14.5)
ERYTHROCYTE [DISTWIDTH] IN BLOOD BY AUTOMATED COUNT: 16.3 % (ref 11.5–14.5)
EST. AVERAGE GLUCOSE BLD GHB EST-MCNC: 111 MG/DL
FLUAV RNA SPEC QL NAA+PROBE: NOT DETECTED
FLUAV SUBTYP SPEC NAA+PROBE: NOT DETECTED
FLUBV RNA SPEC QL NAA+PROBE: NOT DETECTED
FLUBV RNA SPEC QL NAA+PROBE: NOT DETECTED
GLOBULIN SER CALC-MCNC: 3 G/DL (ref 2–4)
GLOBULIN SER CALC-MCNC: 3.8 G/DL (ref 2–4)
GLUCOSE BLD STRIP.AUTO-MCNC: 101 MG/DL (ref 65–117)
GLUCOSE BLD STRIP.AUTO-MCNC: 114 MG/DL (ref 65–117)
GLUCOSE BLD STRIP.AUTO-MCNC: 132 MG/DL (ref 65–117)
GLUCOSE BLD STRIP.AUTO-MCNC: 137 MG/DL (ref 65–117)
GLUCOSE BLD STRIP.AUTO-MCNC: 152 MG/DL (ref 65–117)
GLUCOSE BLD STRIP.AUTO-MCNC: 63 MG/DL (ref 65–117)
GLUCOSE BLD STRIP.AUTO-MCNC: 81 MG/DL (ref 65–117)
GLUCOSE SERPL-MCNC: 113 MG/DL (ref 65–100)
GLUCOSE SERPL-MCNC: 126 MG/DL (ref 65–100)
GLUCOSE SERPL-MCNC: 73 MG/DL (ref 65–100)
GLUCOSE SERPL-MCNC: 98 MG/DL (ref 65–100)
GLUCOSE UR STRIP.AUTO-MCNC: NEGATIVE MG/DL
HADV DNA SPEC QL NAA+PROBE: NOT DETECTED
HBA1C MFR BLD: 5.5 % (ref 4–5.6)
HCO3 BLDA-SCNC: 30 MMOL/L (ref 22–26)
HCOV 229E RNA SPEC QL NAA+PROBE: NOT DETECTED
HCOV HKU1 RNA SPEC QL NAA+PROBE: NOT DETECTED
HCOV NL63 RNA SPEC QL NAA+PROBE: NOT DETECTED
HCOV OC43 RNA SPEC QL NAA+PROBE: NOT DETECTED
HCT VFR BLD AUTO: 36.3 % (ref 35–47)
HCT VFR BLD AUTO: 41.1 % (ref 35–47)
HCT VFR BLD AUTO: 46.1 % (ref 35–47)
HGB BLD-MCNC: 11.4 G/DL (ref 11.5–16)
HGB BLD-MCNC: 13.4 G/DL (ref 11.5–16)
HGB BLD-MCNC: 14.5 G/DL (ref 11.5–16)
HGB UR QL STRIP: NEGATIVE
HMPV RNA SPEC QL NAA+PROBE: NOT DETECTED
HPIV1 RNA SPEC QL NAA+PROBE: NOT DETECTED
HPIV2 RNA SPEC QL NAA+PROBE: NOT DETECTED
HPIV3 RNA SPEC QL NAA+PROBE: NOT DETECTED
HPIV4 RNA SPEC QL NAA+PROBE: NOT DETECTED
HYALINE CASTS URNS QL MICRO: ABNORMAL /LPF (ref 0–2)
IMM GRANULOCYTES # BLD AUTO: 0 K/UL
IMM GRANULOCYTES # BLD AUTO: 0 K/UL (ref 0–0.04)
IMM GRANULOCYTES # BLD AUTO: 0.1 K/UL (ref 0–0.04)
IMM GRANULOCYTES NFR BLD AUTO: 0 %
IMM GRANULOCYTES NFR BLD AUTO: 0 % (ref 0–0.5)
IMM GRANULOCYTES NFR BLD AUTO: 2 % (ref 0–0.5)
INR PPP: 1 (ref 0.9–1.1)
KETONES UR QL STRIP.AUTO: 15 MG/DL
LACTATE BLD-SCNC: 1.88 MMOL/L (ref 0.4–2)
LACTATE SERPL-SCNC: 0.9 MMOL/L (ref 0.4–2)
LEUKOCYTE ESTERASE UR QL STRIP.AUTO: NEGATIVE
LYMPHOCYTES # BLD: 2.1 K/UL (ref 0.8–3.5)
LYMPHOCYTES # BLD: 3 K/UL (ref 0.8–3.5)
LYMPHOCYTES # BLD: 6 K/UL (ref 0.8–3.5)
LYMPHOCYTES NFR BLD: 72 % (ref 12–49)
LYMPHOCYTES NFR BLD: 80 % (ref 12–49)
LYMPHOCYTES NFR BLD: 83 % (ref 12–49)
M PNEUMO DNA SPEC QL NAA+PROBE: NOT DETECTED
MAGNESIUM SERPL-MCNC: 1.5 MG/DL (ref 1.6–2.4)
MAGNESIUM SERPL-MCNC: 1.8 MG/DL (ref 1.6–2.4)
MCH RBC QN AUTO: 29.2 PG (ref 26–34)
MCH RBC QN AUTO: 29.4 PG (ref 26–34)
MCH RBC QN AUTO: 29.5 PG (ref 26–34)
MCHC RBC AUTO-ENTMCNC: 31.4 G/DL (ref 30–36.5)
MCHC RBC AUTO-ENTMCNC: 31.5 G/DL (ref 30–36.5)
MCHC RBC AUTO-ENTMCNC: 32.6 G/DL (ref 30–36.5)
MCV RBC AUTO: 89.5 FL (ref 80–99)
MCV RBC AUTO: 93.3 FL (ref 80–99)
MCV RBC AUTO: 93.8 FL (ref 80–99)
MONOCYTES # BLD: 0.1 K/UL (ref 0–1)
MONOCYTES # BLD: 0.3 K/UL (ref 0–1)
MONOCYTES # BLD: 0.3 K/UL (ref 0–1)
MONOCYTES NFR BLD: 4 % (ref 5–13)
MONOCYTES NFR BLD: 5 % (ref 5–13)
MONOCYTES NFR BLD: 6 % (ref 5–13)
NEUTS SEG # BLD: 0.4 K/UL (ref 1.8–8)
NEUTS SEG # BLD: 0.8 K/UL (ref 1.8–8)
NEUTS SEG # BLD: 0.9 K/UL (ref 1.8–8)
NEUTS SEG NFR BLD: 12 % (ref 32–75)
NEUTS SEG NFR BLD: 15 % (ref 32–75)
NEUTS SEG NFR BLD: 19 % (ref 32–75)
NITRITE UR QL STRIP.AUTO: POSITIVE
NRBC # BLD: 0 K/UL (ref 0–0.01)
NRBC BLD-RTO: 0 PER 100 WBC
NT PRO BNP: 207 PG/ML
PCO2 BLDA: 47 MMHG (ref 35–45)
PH BLDA: 7.42 (ref 7.35–7.45)
PH UR STRIP: 5.5 (ref 5–8)
PHOSPHATE SERPL-MCNC: 2.5 MG/DL (ref 2.6–4.7)
PHOSPHATE SERPL-MCNC: 3.1 MG/DL (ref 2.6–4.7)
PLATELET # BLD AUTO: 105 K/UL (ref 150–400)
PLATELET # BLD AUTO: 114 K/UL (ref 150–400)
PLATELET # BLD AUTO: 154 K/UL (ref 150–400)
PMV BLD AUTO: 10.6 FL (ref 8.9–12.9)
PMV BLD AUTO: 10.9 FL (ref 8.9–12.9)
PMV BLD AUTO: 9.9 FL (ref 8.9–12.9)
PO2 BLDA: 49 MMHG (ref 80–100)
POTASSIUM SERPL-SCNC: 1.9 MMOL/L (ref 3.5–5.1)
POTASSIUM SERPL-SCNC: 2.9 MMOL/L (ref 3.5–5.1)
POTASSIUM SERPL-SCNC: 3.7 MMOL/L (ref 3.5–5.1)
POTASSIUM SERPL-SCNC: 4.4 MMOL/L (ref 3.5–5.1)
PROCALCITONIN SERPL-MCNC: <0.05 NG/ML
PROT SERPL-MCNC: 5.5 G/DL (ref 6.4–8.2)
PROT SERPL-MCNC: 7.3 G/DL (ref 6.4–8.2)
PROT UR STRIP-MCNC: ABNORMAL MG/DL
PROTHROMBIN TIME: 10.8 SEC (ref 9–11.1)
RBC # BLD AUTO: 3.87 M/UL (ref 3.8–5.2)
RBC # BLD AUTO: 4.59 M/UL (ref 3.8–5.2)
RBC # BLD AUTO: 4.94 M/UL (ref 3.8–5.2)
RBC #/AREA URNS HPF: ABNORMAL /HPF (ref 0–5)
RBC MORPH BLD: ABNORMAL
RSV RNA SPEC QL NAA+PROBE: NOT DETECTED
RV+EV RNA SPEC QL NAA+PROBE: NOT DETECTED
SAO2 % BLD: 85 % (ref 92–97)
SAO2% DEVICE SAO2% SENSOR NAME: ABNORMAL
SARS-COV-2 RNA RESP QL NAA+PROBE: NOT DETECTED
SARS-COV-2 RNA RESP QL NAA+PROBE: NOT DETECTED
SERVICE CMNT-IMP: ABNORMAL
SERVICE CMNT-IMP: NORMAL
SODIUM SERPL-SCNC: 140 MMOL/L (ref 136–145)
SODIUM SERPL-SCNC: 143 MMOL/L (ref 136–145)
SODIUM SERPL-SCNC: 143 MMOL/L (ref 136–145)
SODIUM SERPL-SCNC: 145 MMOL/L (ref 136–145)
SOURCE: NORMAL
SP GR UR REFRACTOMETRY: 1.02 (ref 1–1.03)
SPECIMEN SITE: ABNORMAL
THERAPEUTIC RANGE: NORMAL SECS (ref 58–77)
TROPONIN I SERPL HS-MCNC: 33 NG/L (ref 0–51)
TROPONIN I SERPL HS-MCNC: 8794 NG/L (ref 0–51)
TSH SERPL DL<=0.05 MIU/L-ACNC: 0.75 UIU/ML (ref 0.36–3.74)
UROBILINOGEN UR QL STRIP.AUTO: 0.2 EU/DL (ref 0.2–1)
WBC # BLD AUTO: 2.6 K/UL (ref 3.6–11)
WBC # BLD AUTO: 4.2 K/UL (ref 3.6–11)
WBC # BLD AUTO: 7.3 K/UL (ref 3.6–11)
WBC MORPH BLD: ABNORMAL
WBC URNS QL MICRO: ABNORMAL /HPF (ref 0–4)

## 2024-01-01 PROCEDURE — 2500000003 HC RX 250 WO HCPCS: Performed by: INTERNAL MEDICINE

## 2024-01-01 PROCEDURE — 2500000003 HC RX 250 WO HCPCS: Performed by: NURSE PRACTITIONER

## 2024-01-01 PROCEDURE — 6360000002 HC RX W HCPCS: Performed by: INTERNAL MEDICINE

## 2024-01-01 PROCEDURE — 1100000000 HC RM PRIVATE

## 2024-01-01 PROCEDURE — 2580000003 HC RX 258: Performed by: INTERNAL MEDICINE

## 2024-01-01 PROCEDURE — 81001 URINALYSIS AUTO W/SCOPE: CPT

## 2024-01-01 PROCEDURE — 99232 SBSQ HOSP IP/OBS MODERATE 35: CPT | Performed by: NURSE PRACTITIONER

## 2024-01-01 PROCEDURE — 94761 N-INVAS EAR/PLS OXIMETRY MLT: CPT

## 2024-01-01 PROCEDURE — 86140 C-REACTIVE PROTEIN: CPT

## 2024-01-01 PROCEDURE — 84145 PROCALCITONIN (PCT): CPT

## 2024-01-01 PROCEDURE — 2580000003 HC RX 258: Performed by: EMERGENCY MEDICINE

## 2024-01-01 PROCEDURE — 6370000000 HC RX 637 (ALT 250 FOR IP): Performed by: INTERNAL MEDICINE

## 2024-01-01 PROCEDURE — 84100 ASSAY OF PHOSPHORUS: CPT

## 2024-01-01 PROCEDURE — 2500000003 HC RX 250 WO HCPCS: Performed by: EMERGENCY MEDICINE

## 2024-01-01 PROCEDURE — 87040 BLOOD CULTURE FOR BACTERIA: CPT

## 2024-01-01 PROCEDURE — 0202U NFCT DS 22 TRGT SARS-COV-2: CPT

## 2024-01-01 PROCEDURE — 85025 COMPLETE CBC W/AUTO DIFF WBC: CPT

## 2024-01-01 PROCEDURE — 2700000000 HC OXYGEN THERAPY PER DAY

## 2024-01-01 PROCEDURE — 96375 TX/PRO/DX INJ NEW DRUG ADDON: CPT

## 2024-01-01 PROCEDURE — 92611 MOTION FLUOROSCOPY/SWALLOW: CPT

## 2024-01-01 PROCEDURE — 80053 COMPREHEN METABOLIC PANEL: CPT

## 2024-01-01 PROCEDURE — 6360000004 HC RX CONTRAST MEDICATION: Performed by: RADIOLOGY

## 2024-01-01 PROCEDURE — 83036 HEMOGLOBIN GLYCOSYLATED A1C: CPT

## 2024-01-01 PROCEDURE — 96365 THER/PROPH/DIAG IV INF INIT: CPT

## 2024-01-01 PROCEDURE — 85610 PROTHROMBIN TIME: CPT

## 2024-01-01 PROCEDURE — 83880 ASSAY OF NATRIURETIC PEPTIDE: CPT

## 2024-01-01 PROCEDURE — 85730 THROMBOPLASTIN TIME PARTIAL: CPT

## 2024-01-01 PROCEDURE — 6360000002 HC RX W HCPCS: Performed by: EMERGENCY MEDICINE

## 2024-01-01 PROCEDURE — 93005 ELECTROCARDIOGRAM TRACING: CPT | Performed by: EMERGENCY MEDICINE

## 2024-01-01 PROCEDURE — 36600 WITHDRAWAL OF ARTERIAL BLOOD: CPT

## 2024-01-01 PROCEDURE — 93010 ELECTROCARDIOGRAM REPORT: CPT | Performed by: SPECIALIST

## 2024-01-01 PROCEDURE — 36415 COLL VENOUS BLD VENIPUNCTURE: CPT

## 2024-01-01 PROCEDURE — 6370000000 HC RX 637 (ALT 250 FOR IP): Performed by: EMERGENCY MEDICINE

## 2024-01-01 PROCEDURE — 84484 ASSAY OF TROPONIN QUANT: CPT

## 2024-01-01 PROCEDURE — 82550 ASSAY OF CK (CPK): CPT

## 2024-01-01 PROCEDURE — 83605 ASSAY OF LACTIC ACID: CPT

## 2024-01-01 PROCEDURE — 71275 CT ANGIOGRAPHY CHEST: CPT

## 2024-01-01 PROCEDURE — 83735 ASSAY OF MAGNESIUM: CPT

## 2024-01-01 PROCEDURE — 87636 SARSCOV2 & INF A&B AMP PRB: CPT

## 2024-01-01 PROCEDURE — 71045 X-RAY EXAM CHEST 1 VIEW: CPT

## 2024-01-01 PROCEDURE — 82803 BLOOD GASES ANY COMBINATION: CPT

## 2024-01-01 PROCEDURE — 84443 ASSAY THYROID STIM HORMONE: CPT

## 2024-01-01 PROCEDURE — 2580000003 HC RX 258: Performed by: NURSE PRACTITIONER

## 2024-01-01 PROCEDURE — 6360000002 HC RX W HCPCS: Performed by: NURSE PRACTITIONER

## 2024-01-01 PROCEDURE — 80048 BASIC METABOLIC PNL TOTAL CA: CPT

## 2024-01-01 PROCEDURE — 2000000000 HC ICU R&B

## 2024-01-01 PROCEDURE — 82962 GLUCOSE BLOOD TEST: CPT

## 2024-01-01 PROCEDURE — 92612 ENDOSCOPY SWALLOW (FEES) VID: CPT

## 2024-01-01 PROCEDURE — 99285 EMERGENCY DEPT VISIT HI MDM: CPT

## 2024-01-01 PROCEDURE — 99223 1ST HOSP IP/OBS HIGH 75: CPT | Performed by: NURSE PRACTITIONER

## 2024-01-01 PROCEDURE — 5A0935A ASSISTANCE WITH RESPIRATORY VENTILATION, LESS THAN 24 CONSECUTIVE HOURS, HIGH NASAL FLOW/VELOCITY: ICD-10-PCS | Performed by: INTERNAL MEDICINE

## 2024-01-01 RX ORDER — SENNA AND DOCUSATE SODIUM 50; 8.6 MG/1; MG/1
1 TABLET, FILM COATED ORAL 2 TIMES DAILY
Status: DISCONTINUED | OUTPATIENT
Start: 2024-01-01 | End: 2024-01-01

## 2024-01-01 RX ORDER — SCOLOPAMINE TRANSDERMAL SYSTEM 1 MG/1
1 PATCH, EXTENDED RELEASE TRANSDERMAL
Status: DISCONTINUED | OUTPATIENT
Start: 2024-01-01 | End: 2024-09-18 | Stop reason: HOSPADM

## 2024-01-01 RX ORDER — SODIUM CHLORIDE 9 MG/ML
INJECTION, SOLUTION INTRAVENOUS PRN
Status: DISCONTINUED | OUTPATIENT
Start: 2024-01-01 | End: 2024-01-01

## 2024-01-01 RX ORDER — POLYETHYLENE GLYCOL 3350 17 G/17G
17 POWDER, FOR SOLUTION ORAL DAILY PRN
Status: DISCONTINUED | OUTPATIENT
Start: 2024-01-01 | End: 2024-01-01

## 2024-01-01 RX ORDER — ONDANSETRON 2 MG/ML
4 INJECTION INTRAMUSCULAR; INTRAVENOUS EVERY 6 HOURS PRN
Status: DISCONTINUED | OUTPATIENT
Start: 2024-01-01 | End: 2024-09-18 | Stop reason: HOSPADM

## 2024-01-01 RX ORDER — LIDOCAINE HYDROCHLORIDE 10 MG/ML
50 INJECTION, SOLUTION EPIDURAL; INFILTRATION; INTRACAUDAL; PERINEURAL ONCE
Status: DISCONTINUED | OUTPATIENT
Start: 2024-01-01 | End: 2024-01-01

## 2024-01-01 RX ORDER — SODIUM CHLORIDE 0.9 % (FLUSH) 0.9 %
5-40 SYRINGE (ML) INJECTION PRN
Status: DISCONTINUED | OUTPATIENT
Start: 2024-01-01 | End: 2024-01-01

## 2024-01-01 RX ORDER — HALOPERIDOL 5 MG/ML
1 INJECTION INTRAMUSCULAR EVERY 4 HOURS PRN
Status: DISCONTINUED | OUTPATIENT
Start: 2024-01-01 | End: 2024-09-18 | Stop reason: HOSPADM

## 2024-01-01 RX ORDER — ACETAMINOPHEN 325 MG/1
650 TABLET ORAL EVERY 6 HOURS PRN
Status: DISCONTINUED | OUTPATIENT
Start: 2024-01-01 | End: 2024-01-01

## 2024-01-01 RX ORDER — HALOPERIDOL 5 MG/ML
1 INJECTION INTRAMUSCULAR
Status: COMPLETED | OUTPATIENT
Start: 2024-01-01 | End: 2024-01-01

## 2024-01-01 RX ORDER — ENOXAPARIN SODIUM 100 MG/ML
30 INJECTION SUBCUTANEOUS DAILY
Status: DISCONTINUED | OUTPATIENT
Start: 2024-01-01 | End: 2024-01-01

## 2024-01-01 RX ORDER — POTASSIUM CHLORIDE 7.45 MG/ML
10 INJECTION INTRAVENOUS
Status: COMPLETED | OUTPATIENT
Start: 2024-01-01 | End: 2024-01-01

## 2024-01-01 RX ORDER — SODIUM CHLORIDE 0.9 % (FLUSH) 0.9 %
5-40 SYRINGE (ML) INJECTION EVERY 12 HOURS SCHEDULED
Status: DISCONTINUED | OUTPATIENT
Start: 2024-01-01 | End: 2024-01-01

## 2024-01-01 RX ORDER — LORAZEPAM 2 MG/ML
1 INJECTION INTRAMUSCULAR
Status: DISCONTINUED | OUTPATIENT
Start: 2024-01-01 | End: 2024-09-18 | Stop reason: HOSPADM

## 2024-01-01 RX ORDER — HYDROMORPHONE HYDROCHLORIDE 1 MG/ML
1 INJECTION, SOLUTION INTRAMUSCULAR; INTRAVENOUS; SUBCUTANEOUS
Status: DISCONTINUED | OUTPATIENT
Start: 2024-01-01 | End: 2024-09-18 | Stop reason: HOSPADM

## 2024-01-01 RX ORDER — SODIUM CHLORIDE 0.9 % (FLUSH) 0.9 %
5-40 SYRINGE (ML) INJECTION PRN
Status: DISCONTINUED | OUTPATIENT
Start: 2024-01-01 | End: 2024-09-18 | Stop reason: HOSPADM

## 2024-01-01 RX ORDER — GUAIFENESIN 600 MG/1
600 TABLET, EXTENDED RELEASE ORAL 2 TIMES DAILY
Status: DISCONTINUED | OUTPATIENT
Start: 2024-01-01 | End: 2024-01-01

## 2024-01-01 RX ORDER — MINERAL OIL AND WHITE PETROLATUM 150; 830 MG/G; MG/G
OINTMENT OPHTHALMIC PRN
Status: DISCONTINUED | OUTPATIENT
Start: 2024-01-01 | End: 2024-09-18 | Stop reason: HOSPADM

## 2024-01-01 RX ORDER — 0.9 % SODIUM CHLORIDE 0.9 %
30 INTRAVENOUS SOLUTION INTRAVENOUS ONCE
Status: COMPLETED | OUTPATIENT
Start: 2024-01-01 | End: 2024-01-01

## 2024-01-01 RX ORDER — SODIUM CHLORIDE, SODIUM LACTATE, POTASSIUM CHLORIDE, CALCIUM CHLORIDE 600; 310; 30; 20 MG/100ML; MG/100ML; MG/100ML; MG/100ML
INJECTION, SOLUTION INTRAVENOUS CONTINUOUS
Status: DISCONTINUED | OUTPATIENT
Start: 2024-01-01 | End: 2024-01-01 | Stop reason: SDUPTHER

## 2024-01-01 RX ORDER — POTASSIUM CHLORIDE 7.45 MG/ML
10 INJECTION INTRAVENOUS
Status: DISCONTINUED | OUTPATIENT
Start: 2024-01-01 | End: 2024-01-01

## 2024-01-01 RX ORDER — DEXTROSE MONOHYDRATE AND SODIUM CHLORIDE 5; .9 G/100ML; G/100ML
INJECTION, SOLUTION INTRAVENOUS CONTINUOUS
Status: DISCONTINUED | OUTPATIENT
Start: 2024-01-01 | End: 2024-01-01

## 2024-01-01 RX ORDER — DEXTROSE MONOHYDRATE 100 MG/ML
125 INJECTION, SOLUTION INTRAVENOUS ONCE
Status: COMPLETED | OUTPATIENT
Start: 2024-01-01 | End: 2024-01-01

## 2024-01-01 RX ORDER — HALOPERIDOL 5 MG/ML
1 INJECTION INTRAMUSCULAR EVERY 4 HOURS PRN
Status: DISCONTINUED | OUTPATIENT
Start: 2024-01-01 | End: 2024-01-01

## 2024-01-01 RX ORDER — IOPAMIDOL 755 MG/ML
100 INJECTION, SOLUTION INTRAVASCULAR
Status: COMPLETED | OUTPATIENT
Start: 2024-01-01 | End: 2024-01-01

## 2024-01-01 RX ORDER — BENZONATATE 100 MG/1
100 CAPSULE ORAL 3 TIMES DAILY PRN
Status: DISCONTINUED | OUTPATIENT
Start: 2024-01-01 | End: 2024-01-01

## 2024-01-01 RX ORDER — IPRATROPIUM BROMIDE AND ALBUTEROL SULFATE 2.5; .5 MG/3ML; MG/3ML
1 SOLUTION RESPIRATORY (INHALATION) EVERY 4 HOURS PRN
Status: DISCONTINUED | OUTPATIENT
Start: 2024-01-01 | End: 2024-01-01

## 2024-01-01 RX ORDER — ONDANSETRON 2 MG/ML
4 INJECTION INTRAMUSCULAR; INTRAVENOUS EVERY 6 HOURS PRN
Status: DISCONTINUED | OUTPATIENT
Start: 2024-01-01 | End: 2024-01-01 | Stop reason: SDUPTHER

## 2024-01-01 RX ORDER — ONDANSETRON 2 MG/ML
4 INJECTION INTRAMUSCULAR; INTRAVENOUS ONCE
Status: COMPLETED | OUTPATIENT
Start: 2024-01-01 | End: 2024-01-01

## 2024-01-01 RX ORDER — ACETAMINOPHEN 650 MG/1
650 SUPPOSITORY RECTAL EVERY 6 HOURS PRN
Status: DISCONTINUED | OUTPATIENT
Start: 2024-01-01 | End: 2024-01-01

## 2024-01-01 RX ADMIN — FAMOTIDINE 20 MG: 10 INJECTION INTRAVENOUS at 08:19

## 2024-01-01 RX ADMIN — FAMOTIDINE 20 MG: 10 INJECTION INTRAVENOUS at 09:16

## 2024-01-01 RX ADMIN — DOXYCYCLINE 100 MG: 100 INJECTION, POWDER, LYOPHILIZED, FOR SOLUTION INTRAVENOUS at 00:57

## 2024-01-01 RX ADMIN — SODIUM CHLORIDE 3000 MG: 900 INJECTION INTRAVENOUS at 13:10

## 2024-01-01 RX ADMIN — SODIUM CHLORIDE, PRESERVATIVE FREE 10 ML: 5 INJECTION INTRAVENOUS at 08:36

## 2024-01-01 RX ADMIN — POTASSIUM CHLORIDE 10 MEQ: 7.45 INJECTION INTRAVENOUS at 06:47

## 2024-01-01 RX ADMIN — SODIUM CHLORIDE, PRESERVATIVE FREE 10 ML: 5 INJECTION INTRAVENOUS at 08:19

## 2024-01-01 RX ADMIN — SODIUM CHLORIDE, POTASSIUM CHLORIDE, SODIUM LACTATE AND CALCIUM CHLORIDE: 600; 310; 30; 20 INJECTION, SOLUTION INTRAVENOUS at 02:08

## 2024-01-01 RX ADMIN — SODIUM CHLORIDE, POTASSIUM CHLORIDE, SODIUM LACTATE AND CALCIUM CHLORIDE: 600; 310; 30; 20 INJECTION, SOLUTION INTRAVENOUS at 16:10

## 2024-01-01 RX ADMIN — FAMOTIDINE 20 MG: 10 INJECTION INTRAVENOUS at 08:36

## 2024-01-01 RX ADMIN — SODIUM CHLORIDE 3000 MG: 900 INJECTION INTRAVENOUS at 12:52

## 2024-01-01 RX ADMIN — SODIUM CHLORIDE 3000 MG: 900 INJECTION INTRAVENOUS at 08:39

## 2024-01-01 RX ADMIN — LORAZEPAM 1 MG: 2 INJECTION INTRAMUSCULAR; INTRAVENOUS at 03:48

## 2024-01-01 RX ADMIN — ALBUTEROL SULFATE 1 DOSE: 2.5 SOLUTION RESPIRATORY (INHALATION) at 22:12

## 2024-01-01 RX ADMIN — SODIUM CHLORIDE 1080 ML: 9 INJECTION, SOLUTION INTRAVENOUS at 00:27

## 2024-01-01 RX ADMIN — ENOXAPARIN SODIUM 30 MG: 100 INJECTION SUBCUTANEOUS at 08:35

## 2024-01-01 RX ADMIN — FAMOTIDINE 20 MG: 10 INJECTION INTRAVENOUS at 22:05

## 2024-01-01 RX ADMIN — SODIUM CHLORIDE 3000 MG: 900 INJECTION INTRAVENOUS at 13:07

## 2024-01-01 RX ADMIN — FAMOTIDINE 20 MG: 10 INJECTION INTRAVENOUS at 07:49

## 2024-01-01 RX ADMIN — SODIUM CHLORIDE, PRESERVATIVE FREE 10 ML: 5 INJECTION INTRAVENOUS at 20:57

## 2024-01-01 RX ADMIN — FAMOTIDINE 20 MG: 10 INJECTION INTRAVENOUS at 20:36

## 2024-01-01 RX ADMIN — FAMOTIDINE 20 MG: 10 INJECTION INTRAVENOUS at 19:59

## 2024-01-01 RX ADMIN — WATER 2000 MG: 1 INJECTION INTRAMUSCULAR; INTRAVENOUS; SUBCUTANEOUS at 00:28

## 2024-01-01 RX ADMIN — SODIUM CHLORIDE 3000 MG: 900 INJECTION INTRAVENOUS at 01:13

## 2024-01-01 RX ADMIN — SODIUM CHLORIDE 3000 MG: 900 INJECTION INTRAVENOUS at 07:22

## 2024-01-01 RX ADMIN — SODIUM CHLORIDE, PRESERVATIVE FREE 10 ML: 5 INJECTION INTRAVENOUS at 07:49

## 2024-01-01 RX ADMIN — DEXTROSE AND SODIUM CHLORIDE: 5; 900 INJECTION, SOLUTION INTRAVENOUS at 06:38

## 2024-01-01 RX ADMIN — SODIUM CHLORIDE 3000 MG: 900 INJECTION INTRAVENOUS at 01:50

## 2024-01-01 RX ADMIN — SODIUM CHLORIDE 3000 MG: 900 INJECTION INTRAVENOUS at 14:31

## 2024-01-01 RX ADMIN — SODIUM CHLORIDE, POTASSIUM CHLORIDE, SODIUM LACTATE AND CALCIUM CHLORIDE: 600; 310; 30; 20 INJECTION, SOLUTION INTRAVENOUS at 21:20

## 2024-01-01 RX ADMIN — FAMOTIDINE 20 MG: 10 INJECTION INTRAVENOUS at 20:56

## 2024-01-01 RX ADMIN — LORAZEPAM 1 MG: 2 INJECTION INTRAMUSCULAR; INTRAVENOUS at 15:43

## 2024-01-01 RX ADMIN — PIPERACILLIN AND TAZOBACTAM 4500 MG: 4; .5 INJECTION, POWDER, FOR SOLUTION INTRAVENOUS at 05:41

## 2024-01-01 RX ADMIN — SODIUM CHLORIDE, PRESERVATIVE FREE 10 ML: 5 INJECTION INTRAVENOUS at 09:17

## 2024-01-01 RX ADMIN — SODIUM CHLORIDE 3000 MG: 900 INJECTION INTRAVENOUS at 06:38

## 2024-01-01 RX ADMIN — IOPAMIDOL 70 ML: 755 INJECTION, SOLUTION INTRAVENOUS at 23:51

## 2024-01-01 RX ADMIN — ENOXAPARIN SODIUM 30 MG: 100 INJECTION SUBCUTANEOUS at 08:18

## 2024-01-01 RX ADMIN — POTASSIUM CHLORIDE 10 MEQ: 7.46 INJECTION, SOLUTION INTRAVENOUS at 13:55

## 2024-01-01 RX ADMIN — MINERAL OIL AND WHITE PETROLATUM: 30; 940 OINTMENT OPHTHALMIC at 11:58

## 2024-01-01 RX ADMIN — FAMOTIDINE 20 MG: 10 INJECTION INTRAVENOUS at 08:35

## 2024-01-01 RX ADMIN — POTASSIUM CHLORIDE 10 MEQ: 7.45 INJECTION INTRAVENOUS at 05:39

## 2024-01-01 RX ADMIN — SODIUM CHLORIDE 3000 MG: 900 INJECTION INTRAVENOUS at 13:15

## 2024-01-01 RX ADMIN — DEXTROSE AND SODIUM CHLORIDE: 5; 900 INJECTION, SOLUTION INTRAVENOUS at 11:48

## 2024-01-01 RX ADMIN — SODIUM CHLORIDE 3000 MG: 900 INJECTION INTRAVENOUS at 18:43

## 2024-01-01 RX ADMIN — DEXTROSE AND SODIUM CHLORIDE: 5; 900 INJECTION, SOLUTION INTRAVENOUS at 02:06

## 2024-01-01 RX ADMIN — SODIUM CHLORIDE, PRESERVATIVE FREE 10 ML: 5 INJECTION INTRAVENOUS at 19:59

## 2024-01-01 RX ADMIN — DEXTROSE AND SODIUM CHLORIDE: 5; 900 INJECTION, SOLUTION INTRAVENOUS at 20:59

## 2024-01-01 RX ADMIN — SODIUM CHLORIDE, POTASSIUM CHLORIDE, SODIUM LACTATE AND CALCIUM CHLORIDE: 600; 310; 30; 20 INJECTION, SOLUTION INTRAVENOUS at 05:49

## 2024-01-01 RX ADMIN — FAMOTIDINE 20 MG: 10 INJECTION INTRAVENOUS at 03:33

## 2024-01-01 RX ADMIN — SODIUM CHLORIDE 3000 MG: 900 INJECTION INTRAVENOUS at 19:25

## 2024-01-01 RX ADMIN — VANCOMYCIN HYDROCHLORIDE 1000 MG: 1 INJECTION, POWDER, LYOPHILIZED, FOR SOLUTION INTRAVENOUS at 01:41

## 2024-01-01 RX ADMIN — SODIUM CHLORIDE 3000 MG: 900 INJECTION INTRAVENOUS at 06:04

## 2024-01-01 RX ADMIN — POTASSIUM CHLORIDE 10 MEQ: 7.45 INJECTION INTRAVENOUS at 09:14

## 2024-01-01 RX ADMIN — POTASSIUM CHLORIDE 10 MEQ: 7.45 INJECTION INTRAVENOUS at 10:20

## 2024-01-01 RX ADMIN — ONDANSETRON HYDROCHLORIDE 4 MG: 2 SOLUTION INTRAMUSCULAR; INTRAVENOUS at 23:16

## 2024-01-01 RX ADMIN — HALOPERIDOL LACTATE 1 MG: 5 INJECTION, SOLUTION INTRAMUSCULAR at 15:46

## 2024-01-01 RX ADMIN — HYDROMORPHONE HYDROCHLORIDE 1 MG: 1 INJECTION, SOLUTION INTRAMUSCULAR; INTRAVENOUS; SUBCUTANEOUS at 20:35

## 2024-01-01 RX ADMIN — SODIUM CHLORIDE: 9 INJECTION, SOLUTION INTRAVENOUS at 05:39

## 2024-01-01 RX ADMIN — ENOXAPARIN SODIUM 30 MG: 100 INJECTION SUBCUTANEOUS at 09:16

## 2024-01-01 RX ADMIN — SODIUM CHLORIDE 3000 MG: 900 INJECTION INTRAVENOUS at 18:56

## 2024-01-01 RX ADMIN — DEXTROSE AND SODIUM CHLORIDE: 5; 900 INJECTION, SOLUTION INTRAVENOUS at 13:05

## 2024-01-01 RX ADMIN — SODIUM CHLORIDE 3000 MG: 900 INJECTION INTRAVENOUS at 01:04

## 2024-01-01 RX ADMIN — SODIUM CHLORIDE 3000 MG: 900 INJECTION INTRAVENOUS at 18:49

## 2024-01-01 RX ADMIN — DEXTROSE MONOHYDRATE 125 ML: 100 INJECTION, SOLUTION INTRAVENOUS at 02:34

## 2024-01-01 RX ADMIN — ENOXAPARIN SODIUM 30 MG: 100 INJECTION SUBCUTANEOUS at 08:20

## 2024-01-01 RX ADMIN — SODIUM CHLORIDE 3000 MG: 900 INJECTION INTRAVENOUS at 01:33

## 2024-01-01 RX ADMIN — FAMOTIDINE 20 MG: 10 INJECTION INTRAVENOUS at 20:51

## 2024-01-10 NOTE — PROGRESS NOTES
"[facility] [address]  [address],  [address]  [place], [address] [facility]    Office [phone]  Fax [phone]  Cell [phone]          Suzette Alcazar is a 69 y.o. female last seen by me 6 months ago. Assessment  Encounter Diagnoses   Name Primary?  NICM (nonischemic cardiomyopathy) ([address] Yes    Inclusion body myositis (IBM)     ACC/AHA stage B systolic heart failure ([address]     Debility     Adult T-cell [facility]/lymphoma in remission ([address]     Polymyositis ([address]      Recommendations:    NICM in the setting of inclusion body myositis. LVEF has been ointhe 35-40% range dating back to 2013. Echo today is largely unchanged. Cardiac MRI 2014 did not demonstrate any obvious myocardial pathology. Her functional capacity is markedly limited due to her myositis. She is wheelchair bound. - Continue low dose carvedilol. Would not add ACE/ARB at this time d/t low-normal BP. Her mental outlook remains very positive despite her disability from her myopathy. Follow-up and Dispositions    · Return in about 6 months (around 8/4/2020). Subjective:    Suzette Alcazar reports she is feeling well overall. Her myopathy is gradually worsening. She is wheelchair bound and requires full assistance for transfers. Patient denies any exertional chest pain, dyspnea, palpitations, syncope, orthopnea, edema or paroxysmal nocturnal dyspnea. She reports having to take a \"double sigh\" at times. She is now see Dr. Lasha Barbosa at [facility]. She has an aide who helps her around the house and drivers her to appointments. She is wheelchair bound.      Cardiac risk factors   HTN no  DM  no  Smoking no  Family hx of CAD yes, mother with CAD    Cardiac testing  Echo 8/14/13 ([facility]) - EF 35-40% global, septal dyskinesis  Lexiscan cardiolite 9/30/13 - normal Lexiscan , EF 47%  Cardiac MRI 6/2014 - normal myocardium, EF 49%    Echo 5/15/2014 - EF 35-40%, inferior AK  Echo 1/15/15 - EF 35-40%, inferior hypokinesis  Echo 6/18/15 - LVEF 35-40%  Echo 6/30/16 - EF 35 % to 40 %. Severe hypokinesis of the basal inferior and basal-mid  inferolateral wall(s). Grade 1 diastolic dysfunction. No significant change when compared to 18-Jun-2015. Echo 10/2/17 - EF 35-40%. G1DD. No change compared to study 6/2016. Echo 2/1/19 - LVEF 40-45%  Echo 2/4/20 - 35-40%, global HK    Past Medical History:   Diagnosis Date    Adult T-cell [facility]/lymphoma ([facility] dx 2009    Dr Trina Lobato [facility])     nonischemic    Inclusion body myositis (IBM)     Polymyositis ([facility] dx 1997    Rx mtx/steroids    Vertigo         Current Outpatient Medications   Medication Sig Dispense Refill    cetirizine (ZYRTEC) 10 mg tablet Take 10 mg by mouth.  carvedilol (COREG) 3.125 mg tablet take 1 tablet by mouth twice a day 180 Tab 3    ergocalciferol (VITAMIN D2) 50,000 unit capsule Take 50,000 Units by mouth every seven (7) days.  UBIDECARENONE/VITAMIN E MIXED (COQ10  PO) Take  by mouth.  folic acid (FOLVITE) 1 mg tablet Take  by mouth daily.  loperamide (IMMODIUM) 2 mg tablet Take 2 mg by mouth daily as needed for Diarrhea.  ascorbic acid (VITAMIN C) 500 mg tablet Take 500 mg by mouth daily.  cyanocobalamin (VITAMIN B-12) 1,000 mcg tablet Take 1,000 mcg by mouth daily. Allergies   Allergen Reactions    Macrobid [Nitrofurantoin Monohyd/M-Cryst] Hives    Morphine Other (comments)     Severe headache    Sulfa (Sulfonamide Antibiotics) Hives      Review of Systems  Constitutional: Negative for fever, chills, malaise/fatigue and diaphoresis. Respiratory: Negative for cough, hemoptysis, sputum production, shortness of breath and wheezing. Cardiovascular: Negative for chest pain, palpitations, orthopnea, claudication, leg swelling and PND. Gastrointestinal: Negative for heartburn, nausea, vomiting, blood in stool and melena. Genitourinary: Negative for dysuria and flank pain. Musculoskeletal: Negative for joint pain and back pain. Skin: Negative for rash. Neurological: Negative for focal weakness, seizures, loss of consciousness, and headaches. Endo/Heme/Allergies: Does not bruise/bleed easily. Psychiatric/Behavioral: Negative for memory loss. The patient does not have insomnia. Physical Exam  Visit Vitals  /72   Pulse 60   Resp 20   Ht 5' 3\" (1.6 m)   Wt 105 lb (47.6 kg)   SpO2 96%   BMI 18.60 kg/m²     Wt Readings from Last 3 Encounters:   02/04/20 105 lb (47.6 kg)   11/26/19 103 lb (46.7 kg)   08/01/19 103 lb (46.7 kg)      General - well developed, thin, wheelchair bound. Neck - JVP normal, thyroid nl  Cardiac - normal S1,S2, no murmurs, rubs or gallops. No clicks  Vascular - carotids without bruits, radials, femorals and pedal pulses equal bilateral  Lungs - clear to auscultation bilaterals, no rales ,wheezing or rhonchi  Abd - soft nontender  Extremities - trace to 1+ ankle edema  Skin - no rash  Neuro - nonfocal  Psych - normal mood and affect    Cardiographics  ECG 9/2013 - SR, IVCD, PRWP, NSSTs, no previous tracing  EKG 10/21/14 - sinus rhythm, NSSTT abnormalities   Echo 6/18/15 - LVEF 35-40%  EKG 11/10/15 - SR, non specific ST-T abnormalities  EKG 3/31/17 - SR 71  EKG 7/17/18 - SR. Normal EKG. Echo 2/1/19 - LVEF 40-45%  EKG 2/4/20 - SR 64, normal  Echo 2/4/20 - 35-40%, global HK        Written by Tess Dumont, as dictated by Patti Brunner M.D.      Patti Brunner MD

## 2024-09-12 PROBLEM — E43 SEVERE PROTEIN-ENERGY MALNUTRITION (HCC): Chronic | Status: ACTIVE | Noted: 2024-01-01

## 2024-09-12 PROBLEM — R13.10 DYSPHAGIA: Status: ACTIVE | Noted: 2024-01-01

## 2024-09-12 PROBLEM — J69.0 ASPIRATION PNEUMONIA (HCC): Status: ACTIVE | Noted: 2024-01-01

## 2024-09-12 PROBLEM — Z51.5 PALLIATIVE CARE ENCOUNTER: Status: ACTIVE | Noted: 2024-01-01

## 2024-09-12 PROBLEM — J96.01 ACUTE RESPIRATORY FAILURE WITH HYPOXIA (HCC): Status: ACTIVE | Noted: 2024-01-01

## 2024-09-12 PROBLEM — Z71.89 DNR (DO NOT RESUSCITATE) DISCUSSION: Status: ACTIVE | Noted: 2024-01-01

## 2024-09-13 PROBLEM — R41.82 ALTERED MENTAL STATUS: Status: ACTIVE | Noted: 2024-01-01
